# Patient Record
Sex: MALE | Race: WHITE | Employment: FULL TIME | URBAN - METROPOLITAN AREA
[De-identification: names, ages, dates, MRNs, and addresses within clinical notes are randomized per-mention and may not be internally consistent; named-entity substitution may affect disease eponyms.]

---

## 2017-01-02 ENCOUNTER — TELEPHONE (OUTPATIENT)
Dept: FAMILY MEDICINE CLINIC | Facility: CLINIC | Age: 52
End: 2017-01-02

## 2017-01-02 ENCOUNTER — OFFICE VISIT (OUTPATIENT)
Dept: FAMILY MEDICINE CLINIC | Facility: CLINIC | Age: 52
End: 2017-01-02

## 2017-01-02 VITALS
HEART RATE: 82 BPM | WEIGHT: 185 LBS | RESPIRATION RATE: 20 BRPM | TEMPERATURE: 99 F | DIASTOLIC BLOOD PRESSURE: 78 MMHG | SYSTOLIC BLOOD PRESSURE: 120 MMHG | BODY MASS INDEX: 27 KG/M2

## 2017-01-02 DIAGNOSIS — J01.00 ACUTE NON-RECURRENT MAXILLARY SINUSITIS: Primary | ICD-10-CM

## 2017-01-02 PROCEDURE — 99213 OFFICE O/P EST LOW 20 MIN: CPT | Performed by: FAMILY MEDICINE

## 2017-01-02 RX ORDER — FLUTICASONE PROPIONATE 50 MCG
1 SPRAY, SUSPENSION (ML) NASAL 2 TIMES DAILY
Qty: 1 BOTTLE | Refills: 0 | Status: SHIPPED | OUTPATIENT
Start: 2017-01-02 | End: 2017-04-20 | Stop reason: ALTCHOICE

## 2017-01-02 RX ORDER — AZITHROMYCIN 250 MG/1
TABLET, FILM COATED ORAL
Qty: 6 TABLET | Refills: 0 | Status: SHIPPED | OUTPATIENT
Start: 2017-01-02 | End: 2017-04-20 | Stop reason: ALTCHOICE

## 2017-01-02 RX ORDER — MOMETASONE 50 UG/1
1 SPRAY, METERED NASAL 2 TIMES DAILY
Qty: 1 BOTTLE | Refills: 1 | Status: SHIPPED | OUTPATIENT
Start: 2017-01-02 | End: 2017-01-02 | Stop reason: ALTCHOICE

## 2017-01-02 NOTE — PROGRESS NOTES
Rock Fox is a 46year old male. Patient presents with:  Sinus Problem: coughing    HPI:    Symptoms started  7  days ago with purulent nasal discharge, maxillary sinus pressure, and headache, a lot of  post-nasal drip. Worsening.     Allergies:  No rhonchi, or rales. Heart: S1/S2 regular no murmurs.             ASSESSMENT/ PLAN:       (J01.00) Acute non-recurrent maxillary sinusitis  (primary encounter diagnosis)  Plan:     Signed Prescriptions Disp Refills    azithromycin (ZITHROMAX Z-LEORA) 250 MG

## 2017-01-02 NOTE — PATIENT INSTRUCTIONS
Acute Sinusitis    Acute sinusitis is inflammation (irritation and swelling) of the sinuses. It is usually from a bacterial infection that follows an upper respiratory viral infection. Your doctor can help you find relief. Read on to learn more.   What is Treatment is designed to unblock the sinus opening and help the cilia work again. Antihistamine and decongestant medications may be prescribed. These can reduce inflammation and decrease fluid production.  If a bacterial infection is present, it is treated

## 2017-01-02 NOTE — TELEPHONE ENCOUNTER
Thanh and spoke with Giovani Caputo. He advised that Nasonex is not covered for patient. Please advise on alternative nasal spray for patient. Thank you!

## 2017-04-20 PROBLEM — I25.3 ATRIAL SEPTAL ANEURYSM: Status: ACTIVE | Noted: 2017-04-20

## 2017-04-20 PROBLEM — I10 ESSENTIAL HYPERTENSION: Status: ACTIVE | Noted: 2017-04-20

## 2017-04-20 PROBLEM — E66.09 NON MORBID OBESITY DUE TO EXCESS CALORIES: Status: ACTIVE | Noted: 2017-04-20

## 2017-04-20 PROBLEM — R01.1 MURMUR: Status: ACTIVE | Noted: 2017-04-20

## 2017-04-20 PROBLEM — E78.2 MIXED HYPERLIPIDEMIA: Status: ACTIVE | Noted: 2017-04-20

## 2018-03-28 ENCOUNTER — OFFICE VISIT (OUTPATIENT)
Dept: FAMILY MEDICINE CLINIC | Facility: CLINIC | Age: 53
End: 2018-03-28

## 2018-03-28 ENCOUNTER — LAB ENCOUNTER (OUTPATIENT)
Dept: LAB | Age: 53
End: 2018-03-28
Attending: FAMILY MEDICINE
Payer: COMMERCIAL

## 2018-03-28 VITALS
TEMPERATURE: 99 F | DIASTOLIC BLOOD PRESSURE: 80 MMHG | HEIGHT: 68 IN | BODY MASS INDEX: 28.19 KG/M2 | WEIGHT: 186 LBS | RESPIRATION RATE: 16 BRPM | HEART RATE: 114 BPM | SYSTOLIC BLOOD PRESSURE: 138 MMHG

## 2018-03-28 DIAGNOSIS — E78.2 MIXED HYPERLIPIDEMIA: ICD-10-CM

## 2018-03-28 DIAGNOSIS — Z13.228 SCREENING FOR ENDOCRINE, NUTRITIONAL, METABOLIC AND IMMUNITY DISORDER: ICD-10-CM

## 2018-03-28 DIAGNOSIS — Z13.21 SCREENING FOR ENDOCRINE, NUTRITIONAL, METABOLIC AND IMMUNITY DISORDER: ICD-10-CM

## 2018-03-28 DIAGNOSIS — J01.01 ACUTE RECURRENT MAXILLARY SINUSITIS: ICD-10-CM

## 2018-03-28 DIAGNOSIS — I10 ESSENTIAL HYPERTENSION: Primary | ICD-10-CM

## 2018-03-28 DIAGNOSIS — Z13.29 SCREENING FOR ENDOCRINE, NUTRITIONAL, METABOLIC AND IMMUNITY DISORDER: ICD-10-CM

## 2018-03-28 DIAGNOSIS — Z13.0 SCREENING FOR ENDOCRINE, NUTRITIONAL, METABOLIC AND IMMUNITY DISORDER: ICD-10-CM

## 2018-03-28 DIAGNOSIS — R35.0 URINE FREQUENCY: ICD-10-CM

## 2018-03-28 LAB
ALBUMIN SERPL-MCNC: 4.2 G/DL (ref 3.5–4.8)
ALP LIVER SERPL-CCNC: 62 U/L (ref 45–117)
ALT SERPL-CCNC: 39 U/L (ref 17–63)
APPEARANCE: CLEAR
AST SERPL-CCNC: 19 U/L (ref 15–41)
BASOPHILS # BLD AUTO: 0.03 X10(3) UL (ref 0–0.1)
BASOPHILS NFR BLD AUTO: 0.3 %
BILIRUB SERPL-MCNC: 0.9 MG/DL (ref 0.1–2)
BILIRUBIN: NEGATIVE
BUN BLD-MCNC: 12 MG/DL (ref 8–20)
CALCIUM BLD-MCNC: 9.6 MG/DL (ref 8.3–10.3)
CHLORIDE: 99 MMOL/L (ref 101–111)
CHOLEST SMN-MCNC: 185 MG/DL (ref ?–200)
CO2: 30 MMOL/L (ref 22–32)
COMPLEXED PSA SERPL-MCNC: 0.65 NG/ML (ref 0.01–4)
CREAT BLD-MCNC: 0.97 MG/DL (ref 0.7–1.3)
EOSINOPHIL # BLD AUTO: 0.21 X10(3) UL (ref 0–0.3)
EOSINOPHIL NFR BLD AUTO: 2.2 %
ERYTHROCYTE [DISTWIDTH] IN BLOOD BY AUTOMATED COUNT: 11.7 % (ref 11.5–16)
GLUCOSE (URINE DIPSTICK): NEGATIVE MG/DL
GLUCOSE BLD-MCNC: 168 MG/DL (ref 70–99)
HCT VFR BLD AUTO: 48.6 % (ref 37–53)
HDLC SERPL-MCNC: 51 MG/DL (ref 45–?)
HDLC SERPL: 3.63 {RATIO} (ref ?–4.97)
HGB BLD-MCNC: 16 G/DL (ref 13–17)
IMMATURE GRANULOCYTE COUNT: 0.04 X10(3) UL (ref 0–1)
IMMATURE GRANULOCYTE RATIO %: 0.4 %
KETONES (URINE DIPSTICK): NEGATIVE MG/DL
LDLC SERPL CALC-MCNC: 120 MG/DL (ref ?–130)
LEUKOCYTES: NEGATIVE
LYMPHOCYTES # BLD AUTO: 1.16 X10(3) UL (ref 0.9–4)
LYMPHOCYTES NFR BLD AUTO: 12.4 %
M PROTEIN MFR SERPL ELPH: 7.8 G/DL (ref 6.1–8.3)
MCH RBC QN AUTO: 30.8 PG (ref 27–33.2)
MCHC RBC AUTO-ENTMCNC: 32.9 G/DL (ref 31–37)
MCV RBC AUTO: 93.5 FL (ref 80–99)
MONOCYTES # BLD AUTO: 0.7 X10(3) UL (ref 0.1–1)
MONOCYTES NFR BLD AUTO: 7.5 %
MULTISTIX LOT#: NORMAL NUMERIC
NEUTROPHIL ABS PRELIM: 7.24 X10 (3) UL (ref 1.3–6.7)
NEUTROPHILS # BLD AUTO: 7.24 X10(3) UL (ref 1.3–6.7)
NEUTROPHILS NFR BLD AUTO: 77.2 %
NITRITE, URINE: NEGATIVE
NONHDLC SERPL-MCNC: 134 MG/DL (ref ?–130)
OCCULT BLOOD: NEGATIVE
PH, URINE: 6 (ref 4.5–8)
PLATELET # BLD AUTO: 243 10(3)UL (ref 150–450)
POTASSIUM SERPL-SCNC: 4.3 MMOL/L (ref 3.6–5.1)
PROTEIN (URINE DIPSTICK): NEGATIVE MG/DL
RBC # BLD AUTO: 5.2 X10(6)UL (ref 4.3–5.7)
RED CELL DISTRIBUTION WIDTH-SD: 40.1 FL (ref 35.1–46.3)
SODIUM SERPL-SCNC: 136 MMOL/L (ref 136–144)
SPECIFIC GRAVITY: 1.02 (ref 1–1.03)
TRIGL SERPL-MCNC: 70 MG/DL (ref ?–150)
TSI SER-ACNC: 0.51 MIU/ML (ref 0.35–5.5)
URINE-COLOR: YELLOW
UROBILINOGEN,SEMI-QN: 0.2 MG/DL (ref 0–1.9)
VLDLC SERPL CALC-MCNC: 14 MG/DL (ref 5–40)
WBC # BLD AUTO: 9.4 X10(3) UL (ref 4–13)

## 2018-03-28 PROCEDURE — 80050 GENERAL HEALTH PANEL: CPT | Performed by: FAMILY MEDICINE

## 2018-03-28 PROCEDURE — 87086 URINE CULTURE/COLONY COUNT: CPT | Performed by: FAMILY MEDICINE

## 2018-03-28 PROCEDURE — 36415 COLL VENOUS BLD VENIPUNCTURE: CPT | Performed by: FAMILY MEDICINE

## 2018-03-28 PROCEDURE — 84153 ASSAY OF PSA TOTAL: CPT | Performed by: FAMILY MEDICINE

## 2018-03-28 PROCEDURE — 81003 URINALYSIS AUTO W/O SCOPE: CPT | Performed by: FAMILY MEDICINE

## 2018-03-28 PROCEDURE — 80061 LIPID PANEL: CPT | Performed by: FAMILY MEDICINE

## 2018-03-28 PROCEDURE — 99214 OFFICE O/P EST MOD 30 MIN: CPT | Performed by: FAMILY MEDICINE

## 2018-03-28 RX ORDER — SULFAMETHOXAZOLE AND TRIMETHOPRIM 800; 160 MG/1; MG/1
1 TABLET ORAL 2 TIMES DAILY
Qty: 20 TABLET | Refills: 0 | Status: SHIPPED | OUTPATIENT
Start: 2018-03-28 | End: 2018-04-07

## 2018-03-29 NOTE — PROGRESS NOTES
Chief Complaint:   Patient presents with:  Urinary Symptoms (urologic): noticed about one week ago, urine frequency  URI: Symptoms started about 2-3 ago     HPI:   This is a 46year old male presenting for follow up.    Patient has been having urinary compl Prescriptions:  Sulfamethoxazole-TMP DS (BACTRIM DS) 800-160 MG Oral Tab per tablet Take 1 tablet by mouth 2 (two) times daily.  Disp: 20 tablet Rfl: 0   Pravastatin Sodium 80 MG Oral Tab TAKE 1 TABLET EVERY EVENING Disp: 90 tablet Rfl: 1   Doxycycline Hycl from the following:    Height as of this encounter: 68\". Weight as of this encounter: 186 lb. Vital signs reviewed. Appears stated age, well groomed. Physical Exam   Nursing note and vitals reviewed.    Constitutional: He is oriented to person, place, Future  - PSA SCREEN; Future    2. Urine frequency  -will check urine culture.  Empiric treatment with oral abx.   - URINALYSIS, AUTO, W/O SCOPE  - URINE CULTURE, ROUTINE; Future  - URINE CULTURE, ROUTINE  - Sulfamethoxazole-TMP DS (BACTRIM DS) 800-160 MG O

## 2018-03-30 ENCOUNTER — TELEPHONE (OUTPATIENT)
Dept: FAMILY MEDICINE CLINIC | Facility: CLINIC | Age: 53
End: 2018-03-30

## 2018-03-30 DIAGNOSIS — R73.09 ELEVATED GLUCOSE LEVEL: ICD-10-CM

## 2018-03-30 DIAGNOSIS — E78.2 MIXED HYPERLIPIDEMIA: Primary | ICD-10-CM

## 2018-03-30 NOTE — TELEPHONE ENCOUNTER
Called patient and spoke with him. Went over results and POC below. Patient states understanding. Future lab orders placed in Epic. Patient is requesting an order for a dietician. Okay for order? Please advise. Thank you!

## 2018-03-30 NOTE — TELEPHONE ENCOUNTER
----- Message from Dyllan Earl MD sent at 3/30/2018 12:30 PM CDT -----  Recheck CMP and lipid continue with low-fat low-cholesterol needs A1c on recheck labs in 3 months.   Advise based on CMP he may have early signs of diabetes advised on low carb diet es

## 2018-04-02 NOTE — TELEPHONE ENCOUNTER
Order placed in [facility] [address]. Called patient and left message for patient. Advised patient of this information. Patient provided Central Scheduling's information. Advised patient to contact the office with any questions.

## 2018-12-26 ENCOUNTER — OFFICE VISIT (OUTPATIENT)
Dept: FAMILY MEDICINE CLINIC | Facility: CLINIC | Age: 53
End: 2018-12-26
Payer: COMMERCIAL

## 2018-12-26 VITALS
WEIGHT: 189 LBS | SYSTOLIC BLOOD PRESSURE: 132 MMHG | RESPIRATION RATE: 14 BRPM | HEART RATE: 76 BPM | OXYGEN SATURATION: 98 % | TEMPERATURE: 98 F | DIASTOLIC BLOOD PRESSURE: 86 MMHG | BODY MASS INDEX: 28.64 KG/M2 | HEIGHT: 68 IN

## 2018-12-26 DIAGNOSIS — R39.9 UTI SYMPTOMS: Primary | ICD-10-CM

## 2018-12-26 PROCEDURE — 81003 URINALYSIS AUTO W/O SCOPE: CPT | Performed by: NURSE PRACTITIONER

## 2018-12-26 PROCEDURE — 99213 OFFICE O/P EST LOW 20 MIN: CPT | Performed by: NURSE PRACTITIONER

## 2018-12-26 PROCEDURE — 87086 URINE CULTURE/COLONY COUNT: CPT | Performed by: NURSE PRACTITIONER

## 2018-12-26 RX ORDER — SULFAMETHOXAZOLE AND TRIMETHOPRIM 800; 160 MG/1; MG/1
1 TABLET ORAL 2 TIMES DAILY
Qty: 20 TABLET | Refills: 0 | Status: SHIPPED | OUTPATIENT
Start: 2018-12-26 | End: 2019-01-05

## 2018-12-26 NOTE — PROGRESS NOTES
Chief Complaint:   Patient presents with:  UTI: urge to urinate, burning, uncomfortable, x5days     HPI:   This is a 48year old male presenting with UTI symptoms x 5 days.  He reports increased urgency and frequency of urination, as well as intermittent dy palpitations. Gastrointestinal: Negative for nausea, vomiting, abdominal pain, diarrhea, blood in stool, anal bleeding and rectal pain. Genitourinary: Positive for dysuria, urgency and frequency.  Negative for bladder incontinence, hematuria, flank pain Will return to repeat labs. -Low carb diet. -Push fluids.

## 2018-12-26 NOTE — PATIENT INSTRUCTIONS
Urinary Tract Infections in Men    Urinary tract infections (UTIs) are most often caused by bacteria that invade the urinary tract. The bacteria may come from outside the body. Or they may travel from the skin outside of the rectum into the urethra.  Pain The lifestyle changes below will help get rid of your current infection. They may also help prevent future UTIs. · Drink plenty of fluids such as water, juice, or other caffeine-free drinks. This helps flush bacteria out of your system.   · Empty your blad

## 2019-01-02 ENCOUNTER — TELEPHONE (OUTPATIENT)
Dept: FAMILY MEDICINE CLINIC | Facility: CLINIC | Age: 54
End: 2019-01-02

## 2019-01-02 DIAGNOSIS — R30.9 PAIN WITH URINATION: Primary | ICD-10-CM

## 2019-01-02 NOTE — TELEPHONE ENCOUNTER
Pt states he was advised to dc antibiotic, but decided to complete it. Pt is still having discomfort/burning w/ urination. Pt requested referral for urology (as recommended by NP via [employer] msg). Pt given referral and information for urology.  [facility]

## 2019-01-02 NOTE — TELEPHONE ENCOUNTER
Pt called and would like a referral for a urologist and also would like someone to call him back to answer a few questions

## 2019-01-04 ENCOUNTER — OFFICE VISIT (OUTPATIENT)
Dept: FAMILY MEDICINE CLINIC | Facility: CLINIC | Age: 54
End: 2019-01-04
Payer: COMMERCIAL

## 2019-01-04 VITALS
DIASTOLIC BLOOD PRESSURE: 80 MMHG | RESPIRATION RATE: 16 BRPM | HEART RATE: 78 BPM | HEIGHT: 68 IN | SYSTOLIC BLOOD PRESSURE: 122 MMHG | TEMPERATURE: 98 F | BODY MASS INDEX: 28.49 KG/M2 | WEIGHT: 188 LBS

## 2019-01-04 DIAGNOSIS — N28.1 CYST OF LEFT KIDNEY: ICD-10-CM

## 2019-01-04 DIAGNOSIS — R35.89 POLYURIA: Primary | ICD-10-CM

## 2019-01-04 DIAGNOSIS — R33.9 INCOMPLETE EMPTYING OF BLADDER: ICD-10-CM

## 2019-01-04 PROCEDURE — 99214 OFFICE O/P EST MOD 30 MIN: CPT | Performed by: FAMILY MEDICINE

## 2019-01-04 RX ORDER — TAMSULOSIN HYDROCHLORIDE 0.4 MG/1
0.4 CAPSULE ORAL DAILY
Qty: 30 CAPSULE | Refills: 3 | Status: SHIPPED | OUTPATIENT
Start: 2019-01-04 | End: 2019-02-03

## 2019-01-04 NOTE — PROGRESS NOTES
Chief Complaint:   Patient presents with: Follow - Up    HPI:   This is a 48year old male presenting for for follow up after seen in [facility] for possible cystitis. Completed course of oral abx, urine culture's negative.    Patient reports mild urinary hesitan Medications:  Sulfamethoxazole-TMP DS (BACTRIM DS) 800-160 MG Oral Tab per tablet Take 1 tablet by mouth 2 (two) times daily for 10 days.  Disp: 20 tablet Rfl: 0   Pravastatin Sodium 80 MG Oral Tab TAKE 1 TABLET EVERY EVENING Disp: 90 tablet Rfl: 0   Doxycy encounter: 68\". Weight as of this encounter: 188 lb. Vital signs reviewed. Appears stated age, well groomed. Physical Exam   Nursing note and vitals reviewed. Constitutional: He is oriented to person, place, and time.  He appears well-developed and tamsulosin HCl (FLOMAX) 0.4 MG Oral Cap; Take 1 capsule (0.4 mg total) by mouth daily. Dispense: 30 capsule; Refill: 3  - US KIDNEY/BLADDER (CPT=76770); Future    3.  Cyst of left kidney  -will refer to dr. Bianka Zambrano for possible cysto  - US KIDNEY/BLADDER (CPT

## 2019-01-05 ENCOUNTER — HOSPITAL ENCOUNTER (OUTPATIENT)
Dept: ULTRASOUND IMAGING | Age: 54
Discharge: HOME | End: 2019-01-05
Attending: FAMILY MEDICINE
Payer: COMMERCIAL

## 2019-01-05 DIAGNOSIS — N28.1 CYST OF LEFT KIDNEY: ICD-10-CM

## 2019-01-05 DIAGNOSIS — R35.89 POLYURIA: ICD-10-CM

## 2019-01-05 DIAGNOSIS — R33.9 INCOMPLETE EMPTYING OF BLADDER: ICD-10-CM

## 2019-01-05 PROCEDURE — 76770 US EXAM ABDO BACK WALL COMP: CPT | Performed by: FAMILY MEDICINE

## 2019-02-14 PROCEDURE — 87086 URINE CULTURE/COLONY COUNT: CPT | Performed by: UROLOGY

## 2019-02-20 ENCOUNTER — TELEPHONE (OUTPATIENT)
Dept: FAMILY MEDICINE CLINIC | Facility: CLINIC | Age: 54
End: 2019-02-20

## 2019-02-20 DIAGNOSIS — Z12.5 SCREENING FOR PROSTATE CANCER: ICD-10-CM

## 2019-02-20 DIAGNOSIS — Z00.00 ROUTINE GENERAL MEDICAL EXAMINATION AT A HEALTH CARE FACILITY: ICD-10-CM

## 2019-02-20 DIAGNOSIS — Z13.0 SCREENING FOR IRON DEFICIENCY ANEMIA: ICD-10-CM

## 2019-02-20 DIAGNOSIS — E78.2 MIXED HYPERLIPIDEMIA: Primary | ICD-10-CM

## 2019-02-20 DIAGNOSIS — Z13.29 SCREENING FOR THYROID DISORDER: ICD-10-CM

## 2019-02-20 DIAGNOSIS — R73.01 ELEVATED FASTING GLUCOSE: ICD-10-CM

## 2019-02-20 NOTE — TELEPHONE ENCOUNTER
Pt would like us to put in orders for blood work. Pt has physical appt on 3/19 and wants to get that done before his appt. Aware that blood work orders will be in the system after 72 hrs.

## 2019-03-19 ENCOUNTER — OFFICE VISIT (OUTPATIENT)
Dept: FAMILY MEDICINE CLINIC | Facility: CLINIC | Age: 54
End: 2019-03-19
Payer: COMMERCIAL

## 2019-03-19 VITALS
BODY MASS INDEX: 29.25 KG/M2 | RESPIRATION RATE: 16 BRPM | SYSTOLIC BLOOD PRESSURE: 122 MMHG | HEART RATE: 72 BPM | TEMPERATURE: 98 F | DIASTOLIC BLOOD PRESSURE: 80 MMHG | HEIGHT: 68 IN | WEIGHT: 193 LBS

## 2019-03-19 DIAGNOSIS — I10 ESSENTIAL HYPERTENSION: ICD-10-CM

## 2019-03-19 DIAGNOSIS — Z00.00 ANNUAL PHYSICAL EXAM: Primary | ICD-10-CM

## 2019-03-19 DIAGNOSIS — E78.2 MIXED HYPERLIPIDEMIA: ICD-10-CM

## 2019-03-19 DIAGNOSIS — N40.0 BENIGN PROSTATIC HYPERPLASIA WITHOUT LOWER URINARY TRACT SYMPTOMS: ICD-10-CM

## 2019-03-19 PROCEDURE — 99396 PREV VISIT EST AGE 40-64: CPT | Performed by: FAMILY MEDICINE

## 2019-03-19 RX ORDER — TAMSULOSIN HYDROCHLORIDE 0.4 MG/1
0.4 CAPSULE ORAL DAILY
Qty: 90 CAPSULE | Refills: 1 | Status: SHIPPED | OUTPATIENT
Start: 2019-03-19 | End: 2019-09-11

## 2019-03-19 RX ORDER — TAMSULOSIN HYDROCHLORIDE 0.4 MG/1
CAPSULE ORAL
COMMUNITY
Start: 2019-03-02 | End: 2019-03-19

## 2019-03-20 NOTE — PROGRESS NOTES
Chief Complaint:   Patient presents with:  Physical    HPI:   This is a 48year old male presenting for annual physical.     Patient has history of HL-stable with statin, no myalgia, normal LFTs. Patient presents for recheck of his hypertension.  Pt has b Negative for photophobia, pain, discharge, redness, itching and visual disturbance. Respiratory: Negative for cough, chest tightness and shortness of breath. Cardiovascular: Negative for chest pain, palpitations and leg swelling.    Gastrointestinal: N JVD present. No tracheal deviation present. No thyromegaly present. Cardiovascular: Normal rate, regular rhythm, normal heart sounds and intact distal pulses. No murmur heard. Edema not present.   Pulmonary/Chest: Effort normal and breath sounds norm

## 2019-09-11 DIAGNOSIS — N40.0 BENIGN PROSTATIC HYPERPLASIA WITHOUT LOWER URINARY TRACT SYMPTOMS: ICD-10-CM

## 2019-09-11 RX ORDER — TAMSULOSIN HYDROCHLORIDE 0.4 MG/1
0.4 CAPSULE ORAL DAILY
Qty: 90 CAPSULE | Refills: 0 | Status: SHIPPED | OUTPATIENT
Start: 2019-09-11 | End: 2019-09-16

## 2019-09-16 DIAGNOSIS — N40.0 BENIGN PROSTATIC HYPERPLASIA WITHOUT LOWER URINARY TRACT SYMPTOMS: ICD-10-CM

## 2019-09-16 RX ORDER — TAMSULOSIN HYDROCHLORIDE 0.4 MG/1
0.4 CAPSULE ORAL DAILY
Qty: 90 CAPSULE | Refills: 0 | Status: SHIPPED | OUTPATIENT
Start: 2019-09-16 | End: 2020-02-26

## 2019-09-16 NOTE — TELEPHONE ENCOUNTER
Medication(s) to Refill:   Requested Prescriptions     Pending Prescriptions Disp Refills   • tamsulosin HCl 0.4 MG Oral Cap 90 capsule 0     Sig: Take 1 capsule (0.4 mg total) by mouth daily.          Reason for Medication Refill being sent to Provider / Ilia Pedro

## 2019-11-11 ENCOUNTER — LAB ENCOUNTER (OUTPATIENT)
Dept: LAB | Age: 54
End: 2019-11-11
Attending: FAMILY MEDICINE
Payer: COMMERCIAL

## 2019-11-11 ENCOUNTER — OFFICE VISIT (OUTPATIENT)
Dept: FAMILY MEDICINE CLINIC | Facility: CLINIC | Age: 54
End: 2019-11-11
Payer: COMMERCIAL

## 2019-11-11 VITALS
DIASTOLIC BLOOD PRESSURE: 76 MMHG | BODY MASS INDEX: 29.4 KG/M2 | SYSTOLIC BLOOD PRESSURE: 128 MMHG | HEIGHT: 68 IN | HEART RATE: 88 BPM | WEIGHT: 194 LBS | RESPIRATION RATE: 16 BRPM | TEMPERATURE: 98 F

## 2019-11-11 DIAGNOSIS — Z13.29 SCREENING FOR THYROID DISORDER: ICD-10-CM

## 2019-11-11 DIAGNOSIS — I10 ESSENTIAL HYPERTENSION: Primary | ICD-10-CM

## 2019-11-11 DIAGNOSIS — R73.01 ELEVATED FASTING GLUCOSE: ICD-10-CM

## 2019-11-11 DIAGNOSIS — Z13.0 SCREENING FOR IRON DEFICIENCY ANEMIA: ICD-10-CM

## 2019-11-11 DIAGNOSIS — J02.9 ACUTE PHARYNGITIS, UNSPECIFIED ETIOLOGY: ICD-10-CM

## 2019-11-11 DIAGNOSIS — E78.2 MIXED HYPERLIPIDEMIA: ICD-10-CM

## 2019-11-11 DIAGNOSIS — Z00.00 ROUTINE GENERAL MEDICAL EXAMINATION AT A HEALTH CARE FACILITY: ICD-10-CM

## 2019-11-11 DIAGNOSIS — E78.5 DYSLIPIDEMIA: ICD-10-CM

## 2019-11-11 DIAGNOSIS — Z12.5 SCREENING FOR PROSTATE CANCER: ICD-10-CM

## 2019-11-11 PROCEDURE — 83036 HEMOGLOBIN GLYCOSYLATED A1C: CPT | Performed by: FAMILY MEDICINE

## 2019-11-11 PROCEDURE — 84153 ASSAY OF PSA TOTAL: CPT | Performed by: FAMILY MEDICINE

## 2019-11-11 PROCEDURE — 80061 LIPID PANEL: CPT | Performed by: FAMILY MEDICINE

## 2019-11-11 PROCEDURE — 80050 GENERAL HEALTH PANEL: CPT | Performed by: FAMILY MEDICINE

## 2019-11-11 PROCEDURE — 36415 COLL VENOUS BLD VENIPUNCTURE: CPT | Performed by: FAMILY MEDICINE

## 2019-11-11 PROCEDURE — 99214 OFFICE O/P EST MOD 30 MIN: CPT | Performed by: FAMILY MEDICINE

## 2019-11-11 RX ORDER — AZITHROMYCIN 250 MG/1
TABLET, FILM COATED ORAL
Qty: 6 TABLET | Refills: 0 | Status: SHIPPED | OUTPATIENT
Start: 2019-11-11 | End: 2019-12-17

## 2019-11-11 RX ORDER — AZELASTINE 1 MG/ML
2 SPRAY, METERED NASAL 2 TIMES DAILY
Qty: 2 BOTTLE | Refills: 3 | Status: SHIPPED | OUTPATIENT
Start: 2019-11-11 | End: 2021-02-27 | Stop reason: ALTCHOICE

## 2019-11-12 NOTE — PROGRESS NOTES
Chief Complaint:   Patient presents with: Follow - Up    HPI:   This is a 47year old male presenting for follow up:    Patient has history of HL-stable with statin, no myalgia, normal LFTs. Patient presents for recheck of his hypertension.  Pt has been Meds:  Current Outpatient Medications   Medication Sig Dispense Refill   • azithromycin (ZITHROMAX Z-LEORA) 250 MG Oral Tab Take two tablets by mouth today, then one tablet daily.  6 tablet 0   • Azelastine HCl 0.1 % Nasal Solution 2 sprays by Nasal route 2 ( Temp 98.2 °F (36.8 °C) (Oral)   Resp 16   Ht 68\"   Wt 194 lb (88 kg)   BMI 29.50 kg/m²  Estimated body mass index is 29.5 kg/m² as calculated from the following:    Height as of this encounter: 68\". Weight as of this encounter: 194 lb (88 kg).    Vital Dyslipidemia  -stable, CPM    3. Mixed hyperlipidemia  -stable, CPM    4. Acute pharyngitis, unspecified etiology  -oral abx and nasal spray. - azithromycin (ZITHROMAX Z-LEORA) 250 MG Oral Tab; Take two tablets by mouth today, then one tablet daily.   Dispe

## 2019-11-13 ENCOUNTER — TELEPHONE (OUTPATIENT)
Dept: FAMILY MEDICINE CLINIC | Facility: CLINIC | Age: 54
End: 2019-11-13

## 2019-11-13 DIAGNOSIS — E78.2 MIXED HYPERLIPIDEMIA: Primary | ICD-10-CM

## 2019-11-13 DIAGNOSIS — R73.03 PREDIABETES: ICD-10-CM

## 2019-11-13 NOTE — TELEPHONE ENCOUNTER
----- Message from Elizabeth Barahona MD sent at 11/13/2019 10:11 AM CST -----  Stable prediabetic labs recheck in 6 months.

## 2019-11-13 NOTE — TELEPHONE ENCOUNTER
Left detailed VM for pt regarding results and instructions listed below. Advised pt on dietary modification. Pt instructed to call back with any further questions/concerns.

## 2019-12-17 ENCOUNTER — OFFICE VISIT (OUTPATIENT)
Dept: FAMILY MEDICINE CLINIC | Facility: CLINIC | Age: 54
End: 2019-12-17
Payer: COMMERCIAL

## 2019-12-17 VITALS
HEART RATE: 94 BPM | RESPIRATION RATE: 16 BRPM | BODY MASS INDEX: 29.55 KG/M2 | HEIGHT: 68 IN | WEIGHT: 195 LBS | TEMPERATURE: 98 F | DIASTOLIC BLOOD PRESSURE: 80 MMHG | SYSTOLIC BLOOD PRESSURE: 132 MMHG

## 2019-12-17 DIAGNOSIS — E78.2 MIXED HYPERLIPIDEMIA: ICD-10-CM

## 2019-12-17 DIAGNOSIS — R73.03 PRE-DIABETES: Primary | ICD-10-CM

## 2019-12-17 DIAGNOSIS — B37.9 YEAST INFECTION: ICD-10-CM

## 2019-12-17 DIAGNOSIS — I10 ESSENTIAL HYPERTENSION: ICD-10-CM

## 2019-12-17 PROCEDURE — 99213 OFFICE O/P EST LOW 20 MIN: CPT | Performed by: FAMILY MEDICINE

## 2019-12-17 RX ORDER — FLUCONAZOLE 150 MG/1
150 TABLET ORAL DAILY
Qty: 3 TABLET | Refills: 0 | Status: SHIPPED | OUTPATIENT
Start: 2019-12-17 | End: 2019-12-20

## 2019-12-18 NOTE — PROGRESS NOTES
Chief Complaint:   Patient presents with:  Derm Problem: rash on gential area noticed about 1-2 weeks ago     HPI:   This is a 47year old male presenting with rash to genital area, was exposed to yeast per his wife. Patient reports no urinary hesitancy. use: No    Family History:  Family History   Problem Relation Age of Onset   • Cancer Mother    • Cancer Maternal Grandmother    • Heart Disease Neg    • Stroke Neg      Allergies:  No Known Allergies  Current Meds:  Current Outpatient Medications   Medica adenopathy. Does not bruise/bleed easily. Psychiatric/Behavioral: Negative for suicidal ideas, behavioral problems, sleep disturbance and agitation. The patient is not nervous/anxious.         EXAM:   /80   Pulse 94   Temp 98.3 °F (36.8 °C) (Oral) mood and affect. His behavior is normal. Judgment and thought content normal.        ASSESSMENT AND PLAN:   Diagnoses and all orders for this visit:  1. Pre-diabetes  -continue with diet control, will check in 6 months.      2. Essential hypertension  -stab

## 2020-02-26 DIAGNOSIS — N40.0 BENIGN PROSTATIC HYPERPLASIA WITHOUT LOWER URINARY TRACT SYMPTOMS: ICD-10-CM

## 2020-02-26 RX ORDER — TAMSULOSIN HYDROCHLORIDE 0.4 MG/1
CAPSULE ORAL
Qty: 90 CAPSULE | Refills: 0 | Status: SHIPPED | OUTPATIENT
Start: 2020-02-26 | End: 2020-05-15

## 2020-02-26 NOTE — TELEPHONE ENCOUNTER
Medication(s) to Refill:   Requested Prescriptions     Pending Prescriptions Disp Refills   • tamsulosin (FLOMAX) cap [Pharmacy Med Name: TAMSULOSIN CAP 0.4MG] 90 capsule 0     Sig: TAKE 1 CAPSULE DAILY         Reason for Medication Refill being sent to Pr

## 2020-03-03 PROBLEM — M23.91 INTERNAL DERANGEMENT OF KNEE, RIGHT: Status: ACTIVE | Noted: 2020-03-03

## 2020-05-15 DIAGNOSIS — N40.0 BENIGN PROSTATIC HYPERPLASIA WITHOUT LOWER URINARY TRACT SYMPTOMS: ICD-10-CM

## 2020-05-15 RX ORDER — TAMSULOSIN HYDROCHLORIDE 0.4 MG/1
CAPSULE ORAL
Qty: 90 CAPSULE | Refills: 0 | Status: SHIPPED | OUTPATIENT
Start: 2020-05-15 | End: 2020-09-01

## 2020-09-01 DIAGNOSIS — N40.0 BENIGN PROSTATIC HYPERPLASIA WITHOUT LOWER URINARY TRACT SYMPTOMS: ICD-10-CM

## 2020-09-01 RX ORDER — TAMSULOSIN HYDROCHLORIDE 0.4 MG/1
CAPSULE ORAL
Qty: 90 CAPSULE | Refills: 0 | Status: SHIPPED | OUTPATIENT
Start: 2020-09-01 | End: 2020-11-30

## 2020-11-28 DIAGNOSIS — N40.0 BENIGN PROSTATIC HYPERPLASIA WITHOUT LOWER URINARY TRACT SYMPTOMS: ICD-10-CM

## 2020-11-30 RX ORDER — TAMSULOSIN HYDROCHLORIDE 0.4 MG/1
CAPSULE ORAL
Qty: 90 CAPSULE | Refills: 0 | Status: SHIPPED | OUTPATIENT
Start: 2020-11-30 | End: 2021-02-27

## 2021-02-26 DIAGNOSIS — N40.0 BENIGN PROSTATIC HYPERPLASIA WITHOUT LOWER URINARY TRACT SYMPTOMS: ICD-10-CM

## 2021-02-27 ENCOUNTER — OFFICE VISIT (OUTPATIENT)
Dept: FAMILY MEDICINE CLINIC | Facility: CLINIC | Age: 56
End: 2021-02-27
Payer: COMMERCIAL

## 2021-02-27 VITALS
DIASTOLIC BLOOD PRESSURE: 84 MMHG | OXYGEN SATURATION: 97 % | BODY MASS INDEX: 30.01 KG/M2 | HEIGHT: 68 IN | SYSTOLIC BLOOD PRESSURE: 130 MMHG | WEIGHT: 198 LBS | HEART RATE: 101 BPM | TEMPERATURE: 98 F | RESPIRATION RATE: 18 BRPM

## 2021-02-27 DIAGNOSIS — R73.03 PRE-DIABETES: ICD-10-CM

## 2021-02-27 DIAGNOSIS — E78.2 MIXED HYPERLIPIDEMIA: ICD-10-CM

## 2021-02-27 DIAGNOSIS — Z98.890 S/P ARTHROSCOPIC SURGERY OF LEFT KNEE: ICD-10-CM

## 2021-02-27 DIAGNOSIS — I10 ESSENTIAL HYPERTENSION: ICD-10-CM

## 2021-02-27 DIAGNOSIS — G44.209 TENSION HEADACHE: Primary | ICD-10-CM

## 2021-02-27 PROCEDURE — 3075F SYST BP GE 130 - 139MM HG: CPT | Performed by: FAMILY MEDICINE

## 2021-02-27 PROCEDURE — 3008F BODY MASS INDEX DOCD: CPT | Performed by: FAMILY MEDICINE

## 2021-02-27 PROCEDURE — 3079F DIAST BP 80-89 MM HG: CPT | Performed by: FAMILY MEDICINE

## 2021-02-27 PROCEDURE — 99214 OFFICE O/P EST MOD 30 MIN: CPT | Performed by: FAMILY MEDICINE

## 2021-02-27 RX ORDER — TAMSULOSIN HYDROCHLORIDE 0.4 MG/1
CAPSULE ORAL
Qty: 90 CAPSULE | Refills: 0 | Status: SHIPPED | OUTPATIENT
Start: 2021-02-27 | End: 2021-05-10

## 2021-02-27 NOTE — PROGRESS NOTES
HPI:    Torrey Brown is a 54year old male who presents for Headache (sinus pressure, minimal drainage )   Patient complains of headaches.    Has had for: few weeks  Description of pain: sinus and frontal   Sudden onset: no  Associated neck pain: mild neck dizziness or headaches. Patient reports no visual disturbances and reports hearing has been about the same. Patient reports no recent injury or trauma. Tension headache with mild sinus drainage.            EXAM:    /84   Pulse 101   Temp 98.3 hypertension  -stable, CPM    3. Mixed hyperlipidemia  -stable, diet controlled. CPM     4. S/P arthroscopic surgery of left knee  -stable, CPM    5. Pre-diabetes  -diet control, will recheck labs in may 2021.   - COMP METABOLIC PANEL (14);  Future  - LIPID

## 2021-05-08 DIAGNOSIS — N40.0 BENIGN PROSTATIC HYPERPLASIA WITHOUT LOWER URINARY TRACT SYMPTOMS: ICD-10-CM

## 2021-05-10 RX ORDER — TAMSULOSIN HYDROCHLORIDE 0.4 MG/1
CAPSULE ORAL
Qty: 90 CAPSULE | Refills: 0 | Status: SHIPPED | OUTPATIENT
Start: 2021-05-10 | End: 2021-08-16

## 2021-08-14 DIAGNOSIS — N40.0 BENIGN PROSTATIC HYPERPLASIA WITHOUT LOWER URINARY TRACT SYMPTOMS: ICD-10-CM

## 2021-08-16 RX ORDER — TAMSULOSIN HYDROCHLORIDE 0.4 MG/1
CAPSULE ORAL
Qty: 90 CAPSULE | Refills: 0 | Status: SHIPPED | OUTPATIENT
Start: 2021-08-16 | End: 2021-11-16

## 2021-08-25 ENCOUNTER — LAB ENCOUNTER (OUTPATIENT)
Dept: LAB | Age: 56
End: 2021-08-25
Attending: ORTHOPAEDIC SURGERY
Payer: COMMERCIAL

## 2021-08-25 DIAGNOSIS — I10 PRIMARY HYPERTENSION: ICD-10-CM

## 2021-08-25 LAB
ANION GAP SERPL CALC-SCNC: 8 MMOL/L (ref 0–18)
BUN BLD-MCNC: 13 MG/DL (ref 7–18)
CALCIUM BLD-MCNC: 9.5 MG/DL (ref 8.5–10.1)
CHLORIDE SERPL-SCNC: 105 MMOL/L (ref 98–112)
CO2 SERPL-SCNC: 26 MMOL/L (ref 21–32)
CREAT BLD-MCNC: 0.88 MG/DL
GLUCOSE BLD-MCNC: 239 MG/DL (ref 70–99)
OSMOLALITY SERPL CALC.SUM OF ELEC: 296 MOSM/KG (ref 275–295)
PATIENT FASTING Y/N/NP: YES
POTASSIUM SERPL-SCNC: 4.2 MMOL/L (ref 3.5–5.1)
SODIUM SERPL-SCNC: 139 MMOL/L (ref 136–145)

## 2021-08-25 PROCEDURE — 36415 COLL VENOUS BLD VENIPUNCTURE: CPT

## 2021-08-25 PROCEDURE — 80048 BASIC METABOLIC PNL TOTAL CA: CPT

## 2021-09-08 PROBLEM — Z98.890 S/P ARTHROSCOPIC SURGERY OF RIGHT KNEE: Status: ACTIVE | Noted: 2021-09-08

## 2021-11-16 DIAGNOSIS — N40.0 BENIGN PROSTATIC HYPERPLASIA WITHOUT LOWER URINARY TRACT SYMPTOMS: ICD-10-CM

## 2021-11-16 RX ORDER — TAMSULOSIN HYDROCHLORIDE 0.4 MG/1
CAPSULE ORAL
Qty: 90 CAPSULE | Refills: 0 | Status: SHIPPED | OUTPATIENT
Start: 2021-11-16

## 2021-12-08 ENCOUNTER — PATIENT MESSAGE (OUTPATIENT)
Dept: FAMILY MEDICINE CLINIC | Facility: CLINIC | Age: 56
End: 2021-12-08

## 2021-12-08 NOTE — TELEPHONE ENCOUNTER
From: Torrey Brown  To: Zonia Montaño MD  Sent: 12/8/2021 3:43 PM CST  Subject: Ferjeanettee Ramya Wade  I was wondering what your opinion is about getting the Covid Vaccine.  Also which one do you recommend over another ie Ricky Avalos or Modern

## 2022-02-15 RX ORDER — TAMSULOSIN HYDROCHLORIDE 0.4 MG/1
CAPSULE ORAL
Qty: 90 CAPSULE | Refills: 0 | Status: SHIPPED | OUTPATIENT
Start: 2022-02-15

## 2022-04-28 LAB
AMB EXT CHOLESTEROL, TOTAL: 161 MG/DL
AMB EXT HDL CHOLESTEROL: 57 MG/DL
AMB EXT HGBA1C: 9.8 %
AMB EXT LDL CHOLESTEROL, DIRECT: 86 MG/DL
AMB EXT TRIGLYCERIDES: 87 MG/DL

## 2022-05-15 DIAGNOSIS — N40.0 BENIGN PROSTATIC HYPERPLASIA WITHOUT LOWER URINARY TRACT SYMPTOMS: ICD-10-CM

## 2022-05-17 RX ORDER — TAMSULOSIN HYDROCHLORIDE 0.4 MG/1
CAPSULE ORAL
Qty: 90 CAPSULE | Refills: 0 | Status: SHIPPED | OUTPATIENT
Start: 2022-05-17

## 2022-05-26 ENCOUNTER — OFFICE VISIT (OUTPATIENT)
Dept: FAMILY MEDICINE CLINIC | Facility: CLINIC | Age: 57
End: 2022-05-26
Payer: COMMERCIAL

## 2022-05-26 VITALS
BODY MASS INDEX: 27.74 KG/M2 | OXYGEN SATURATION: 97 % | DIASTOLIC BLOOD PRESSURE: 80 MMHG | HEIGHT: 68 IN | RESPIRATION RATE: 16 BRPM | WEIGHT: 183 LBS | HEART RATE: 90 BPM | SYSTOLIC BLOOD PRESSURE: 120 MMHG

## 2022-05-26 DIAGNOSIS — Z12.5 SCREENING FOR PROSTATE CANCER: ICD-10-CM

## 2022-05-26 DIAGNOSIS — E78.2 MIXED HYPERLIPIDEMIA: ICD-10-CM

## 2022-05-26 DIAGNOSIS — Z00.00 ROUTINE GENERAL MEDICAL EXAMINATION AT HEALTH CARE FACILITY: Primary | ICD-10-CM

## 2022-05-26 DIAGNOSIS — I10 ESSENTIAL HYPERTENSION: ICD-10-CM

## 2022-05-26 DIAGNOSIS — R73.03 PRE-DIABETES: ICD-10-CM

## 2022-05-31 ENCOUNTER — MED REC SCAN ONLY (OUTPATIENT)
Dept: FAMILY MEDICINE CLINIC | Facility: CLINIC | Age: 57
End: 2022-05-31

## 2022-08-06 DIAGNOSIS — N40.0 BENIGN PROSTATIC HYPERPLASIA WITHOUT LOWER URINARY TRACT SYMPTOMS: ICD-10-CM

## 2022-08-09 RX ORDER — TAMSULOSIN HYDROCHLORIDE 0.4 MG/1
CAPSULE ORAL
Qty: 90 CAPSULE | Refills: 0 | Status: SHIPPED | OUTPATIENT
Start: 2022-08-09

## 2022-09-05 ENCOUNTER — PATIENT MESSAGE (OUTPATIENT)
Dept: FAMILY MEDICINE CLINIC | Facility: CLINIC | Age: 57
End: 2022-09-05

## 2022-09-09 ENCOUNTER — OFFICE VISIT (OUTPATIENT)
Dept: FAMILY MEDICINE CLINIC | Facility: CLINIC | Age: 57
End: 2022-09-09
Payer: COMMERCIAL

## 2022-09-09 VITALS
WEIGHT: 176 LBS | OXYGEN SATURATION: 96 % | RESPIRATION RATE: 16 BRPM | DIASTOLIC BLOOD PRESSURE: 70 MMHG | SYSTOLIC BLOOD PRESSURE: 122 MMHG | BODY MASS INDEX: 26.67 KG/M2 | HEIGHT: 68 IN | HEART RATE: 76 BPM

## 2022-09-09 DIAGNOSIS — E78.2 MIXED HYPERLIPIDEMIA: ICD-10-CM

## 2022-09-09 DIAGNOSIS — R73.03 PRE-DIABETES: Primary | ICD-10-CM

## 2022-09-09 DIAGNOSIS — L23.9 ALLERGIC DERMATITIS: ICD-10-CM

## 2022-09-09 PROCEDURE — 3078F DIAST BP <80 MM HG: CPT | Performed by: FAMILY MEDICINE

## 2022-09-09 PROCEDURE — 3008F BODY MASS INDEX DOCD: CPT | Performed by: FAMILY MEDICINE

## 2022-09-09 PROCEDURE — 3074F SYST BP LT 130 MM HG: CPT | Performed by: FAMILY MEDICINE

## 2022-09-09 PROCEDURE — 99214 OFFICE O/P EST MOD 30 MIN: CPT | Performed by: FAMILY MEDICINE

## 2022-09-10 ENCOUNTER — PATIENT MESSAGE (OUTPATIENT)
Dept: FAMILY MEDICINE CLINIC | Facility: CLINIC | Age: 57
End: 2022-09-10

## 2022-09-12 NOTE — TELEPHONE ENCOUNTER
From: Briseida Combs  To: Michelle Dean MD  Sent: 9/10/2022 8:01 AM CDT  Subject: Prescription     Hello   I saw Dr Asha Varghese yesterday and he was going to prescribe an ointment for my rash. [facility] never got the prescription so could it please be sent over today.  Thanks

## 2022-11-04 DIAGNOSIS — N40.0 BENIGN PROSTATIC HYPERPLASIA WITHOUT LOWER URINARY TRACT SYMPTOMS: ICD-10-CM

## 2022-11-04 RX ORDER — TAMSULOSIN HYDROCHLORIDE 0.4 MG/1
CAPSULE ORAL
Qty: 90 CAPSULE | Refills: 0 | Status: SHIPPED | OUTPATIENT
Start: 2022-11-04

## 2022-11-10 ENCOUNTER — LAB ENCOUNTER (OUTPATIENT)
Dept: LAB | Age: 57
End: 2022-11-10
Attending: FAMILY MEDICINE
Payer: COMMERCIAL

## 2022-11-10 DIAGNOSIS — Z00.00 ROUTINE GENERAL MEDICAL EXAMINATION AT HEALTH CARE FACILITY: ICD-10-CM

## 2022-11-10 DIAGNOSIS — R73.03 PRE-DIABETES: ICD-10-CM

## 2022-11-10 DIAGNOSIS — Z12.5 SCREENING FOR PROSTATE CANCER: ICD-10-CM

## 2022-11-10 LAB
ALBUMIN SERPL-MCNC: 4.1 G/DL (ref 3.4–5)
ALBUMIN/GLOB SERPL: 1.3 {RATIO} (ref 1–2)
ALP LIVER SERPL-CCNC: 53 U/L
ALT SERPL-CCNC: 33 U/L
ANION GAP SERPL CALC-SCNC: 5 MMOL/L (ref 0–18)
AST SERPL-CCNC: 18 U/L (ref 15–37)
BASOPHILS # BLD AUTO: 0.04 X10(3) UL (ref 0–0.2)
BASOPHILS NFR BLD AUTO: 0.7 %
BILIRUB SERPL-MCNC: 1.1 MG/DL (ref 0.1–2)
BUN BLD-MCNC: 16 MG/DL (ref 7–18)
CALCIUM BLD-MCNC: 9.6 MG/DL (ref 8.5–10.1)
CHLORIDE SERPL-SCNC: 105 MMOL/L (ref 98–112)
CHOLEST SERPL-MCNC: 168 MG/DL (ref ?–200)
CO2 SERPL-SCNC: 28 MMOL/L (ref 21–32)
COMPLEXED PSA SERPL-MCNC: 0.69 NG/ML (ref ?–4)
CREAT BLD-MCNC: 0.86 MG/DL
EOSINOPHIL # BLD AUTO: 0.24 X10(3) UL (ref 0–0.7)
EOSINOPHIL NFR BLD AUTO: 4.4 %
ERYTHROCYTE [DISTWIDTH] IN BLOOD BY AUTOMATED COUNT: 11.9 %
EST. AVERAGE GLUCOSE BLD GHB EST-MCNC: 160 MG/DL (ref 68–126)
FASTING PATIENT LIPID ANSWER: YES
FASTING STATUS PATIENT QL REPORTED: YES
GFR SERPLBLD BASED ON 1.73 SQ M-ARVRAT: 101 ML/MIN/1.73M2 (ref 60–?)
GLOBULIN PLAS-MCNC: 3.1 G/DL (ref 2.8–4.4)
GLUCOSE BLD-MCNC: 154 MG/DL (ref 70–99)
HBA1C MFR BLD: 7.2 % (ref ?–5.7)
HCT VFR BLD AUTO: 45.7 %
HDLC SERPL-MCNC: 67 MG/DL (ref 40–59)
HGB BLD-MCNC: 15.5 G/DL
IMM GRANULOCYTES # BLD AUTO: 0.01 X10(3) UL (ref 0–1)
IMM GRANULOCYTES NFR BLD: 0.2 %
LDLC SERPL CALC-MCNC: 88 MG/DL (ref ?–100)
LYMPHOCYTES # BLD AUTO: 1.46 X10(3) UL (ref 1–4)
LYMPHOCYTES NFR BLD AUTO: 26.6 %
MCH RBC QN AUTO: 32 PG (ref 26–34)
MCHC RBC AUTO-ENTMCNC: 33.9 G/DL (ref 31–37)
MCV RBC AUTO: 94.2 FL
MONOCYTES # BLD AUTO: 0.52 X10(3) UL (ref 0.1–1)
MONOCYTES NFR BLD AUTO: 9.5 %
NEUTROPHILS # BLD AUTO: 3.21 X10 (3) UL (ref 1.5–7.7)
NEUTROPHILS # BLD AUTO: 3.21 X10(3) UL (ref 1.5–7.7)
NEUTROPHILS NFR BLD AUTO: 58.6 %
NONHDLC SERPL-MCNC: 101 MG/DL (ref ?–130)
OSMOLALITY SERPL CALC.SUM OF ELEC: 290 MOSM/KG (ref 275–295)
PLATELET # BLD AUTO: 193 10(3)UL (ref 150–450)
POTASSIUM SERPL-SCNC: 4.1 MMOL/L (ref 3.5–5.1)
PROT SERPL-MCNC: 7.2 G/DL (ref 6.4–8.2)
RBC # BLD AUTO: 4.85 X10(6)UL
SODIUM SERPL-SCNC: 138 MMOL/L (ref 136–145)
TRIGL SERPL-MCNC: 69 MG/DL (ref 30–149)
TSI SER-ACNC: 1.25 MIU/ML (ref 0.36–3.74)
VLDLC SERPL CALC-MCNC: 11 MG/DL (ref 0–30)
WBC # BLD AUTO: 5.5 X10(3) UL (ref 4–11)

## 2022-11-10 PROCEDURE — 36415 COLL VENOUS BLD VENIPUNCTURE: CPT

## 2022-11-10 PROCEDURE — 84443 ASSAY THYROID STIM HORMONE: CPT

## 2022-11-10 PROCEDURE — 85025 COMPLETE CBC W/AUTO DIFF WBC: CPT

## 2022-11-10 PROCEDURE — 80053 COMPREHEN METABOLIC PANEL: CPT

## 2022-11-10 PROCEDURE — 83036 HEMOGLOBIN GLYCOSYLATED A1C: CPT

## 2022-11-10 PROCEDURE — 80061 LIPID PANEL: CPT

## 2022-11-14 DIAGNOSIS — E78.2 MIXED HYPERLIPIDEMIA: Primary | ICD-10-CM

## 2022-11-14 DIAGNOSIS — E11.9 TYPE 2 DIABETES MELLITUS WITHOUT COMPLICATION, WITHOUT LONG-TERM CURRENT USE OF INSULIN (HCC): ICD-10-CM

## 2022-11-14 DIAGNOSIS — R73.03 PRE-DIABETES: ICD-10-CM

## 2023-01-04 ENCOUNTER — E-VISIT (OUTPATIENT)
Dept: TELEHEALTH | Age: 58
End: 2023-01-04

## 2023-01-04 DIAGNOSIS — Z02.9 ENCOUNTERS FOR ADMINISTRATIVE PURPOSES: Primary | ICD-10-CM

## 2023-02-02 DIAGNOSIS — N40.0 BENIGN PROSTATIC HYPERPLASIA WITHOUT LOWER URINARY TRACT SYMPTOMS: ICD-10-CM

## 2023-02-04 RX ORDER — TAMSULOSIN HYDROCHLORIDE 0.4 MG/1
CAPSULE ORAL
Qty: 90 CAPSULE | Refills: 0 | Status: SHIPPED | OUTPATIENT
Start: 2023-02-04

## 2023-04-25 ENCOUNTER — PATIENT MESSAGE (OUTPATIENT)
Dept: FAMILY MEDICINE CLINIC | Facility: CLINIC | Age: 58
End: 2023-04-25

## 2023-04-26 NOTE — TELEPHONE ENCOUNTER
From: Michelle Rodriguez  To: Sae Rodriguez MD  Sent: 4/25/2023 6:44 PM CDT  Subject: [place] [facility] [address] Dell Mireles  I was wondering what your thoughts are about the possibility of acquiring a medical marijuana card. I have constant pain in my right knee despite getting cortisone shots and recently doing the 3 shot Eufflexa. I have had little to now no relief. Please let me know if I need to schedule an appointment?    Thanks  Olya Huff

## 2023-05-03 DIAGNOSIS — N40.0 BENIGN PROSTATIC HYPERPLASIA WITHOUT LOWER URINARY TRACT SYMPTOMS: ICD-10-CM

## 2023-05-04 RX ORDER — TAMSULOSIN HYDROCHLORIDE 0.4 MG/1
CAPSULE ORAL
Qty: 90 CAPSULE | Refills: 0 | Status: SHIPPED | OUTPATIENT
Start: 2023-05-04

## 2023-06-08 ENCOUNTER — OFFICE VISIT (OUTPATIENT)
Dept: FAMILY MEDICINE CLINIC | Facility: CLINIC | Age: 58
End: 2023-06-08
Payer: COMMERCIAL

## 2023-06-08 VITALS
WEIGHT: 176 LBS | SYSTOLIC BLOOD PRESSURE: 120 MMHG | RESPIRATION RATE: 16 BRPM | DIASTOLIC BLOOD PRESSURE: 80 MMHG | HEIGHT: 68 IN | BODY MASS INDEX: 26.67 KG/M2 | HEART RATE: 83 BPM | OXYGEN SATURATION: 96 %

## 2023-06-08 DIAGNOSIS — R73.03 PRE-DIABETES: ICD-10-CM

## 2023-06-08 DIAGNOSIS — L30.9 DERMATITIS: ICD-10-CM

## 2023-06-08 DIAGNOSIS — Z00.00 ANNUAL PHYSICAL EXAM: Primary | ICD-10-CM

## 2023-06-08 DIAGNOSIS — I10 ESSENTIAL HYPERTENSION: ICD-10-CM

## 2023-06-08 DIAGNOSIS — E78.2 MIXED HYPERLIPIDEMIA: ICD-10-CM

## 2023-06-08 DIAGNOSIS — M23.91 INTERNAL DERANGEMENT OF KNEE, RIGHT: ICD-10-CM

## 2023-06-08 PROCEDURE — 3008F BODY MASS INDEX DOCD: CPT | Performed by: FAMILY MEDICINE

## 2023-06-08 PROCEDURE — 99396 PREV VISIT EST AGE 40-64: CPT | Performed by: FAMILY MEDICINE

## 2023-06-08 PROCEDURE — 3079F DIAST BP 80-89 MM HG: CPT | Performed by: FAMILY MEDICINE

## 2023-06-08 PROCEDURE — 3074F SYST BP LT 130 MM HG: CPT | Performed by: FAMILY MEDICINE

## 2023-06-08 PROCEDURE — 99214 OFFICE O/P EST MOD 30 MIN: CPT | Performed by: FAMILY MEDICINE

## 2023-06-08 RX ORDER — TRIAMCINOLONE ACETONIDE 1 MG/G
CREAM TOPICAL 2 TIMES DAILY PRN
Qty: 30 G | Refills: 0 | Status: SHIPPED | OUTPATIENT
Start: 2023-06-08 | End: 2023-06-15

## 2023-06-08 RX ORDER — DICLOFENAC SODIUM 75 MG/1
75 TABLET, DELAYED RELEASE ORAL 2 TIMES DAILY PRN
Qty: 60 TABLET | Refills: 1 | Status: SHIPPED | OUTPATIENT
Start: 2023-06-08

## 2023-06-12 ENCOUNTER — PATIENT MESSAGE (OUTPATIENT)
Dept: FAMILY MEDICINE CLINIC | Facility: CLINIC | Age: 58
End: 2023-06-12

## 2023-06-12 DIAGNOSIS — R30.9 PAINFUL URINATION: Primary | ICD-10-CM

## 2023-06-12 NOTE — TELEPHONE ENCOUNTER
Pt forgot to mention stinging pain from urethra throughout the day in addition to upon urination. LOV 6/8/23. triamcinolone has helped some. Please advise.

## 2023-06-16 ENCOUNTER — LABORATORY ENCOUNTER (OUTPATIENT)
Dept: LAB | Age: 58
End: 2023-06-16
Attending: FAMILY MEDICINE
Payer: COMMERCIAL

## 2023-06-16 DIAGNOSIS — R30.9 PAINFUL URINATION: ICD-10-CM

## 2023-06-16 DIAGNOSIS — R73.03 PRE-DIABETES: ICD-10-CM

## 2023-06-16 LAB
CREAT UR-SCNC: <13 MG/DL
MICROALBUMIN UR-MCNC: <0.5 MG/DL

## 2023-06-16 PROCEDURE — 3061F NEG MICROALBUMINURIA REV: CPT | Performed by: FAMILY MEDICINE

## 2023-06-16 PROCEDURE — 87086 URINE CULTURE/COLONY COUNT: CPT

## 2023-06-16 PROCEDURE — 82043 UR ALBUMIN QUANTITATIVE: CPT

## 2023-06-16 PROCEDURE — 82570 ASSAY OF URINE CREATININE: CPT

## 2023-06-17 ENCOUNTER — PATIENT MESSAGE (OUTPATIENT)
Dept: FAMILY MEDICINE CLINIC | Facility: CLINIC | Age: 58
End: 2023-06-17

## 2023-06-18 ENCOUNTER — PATIENT MESSAGE (OUTPATIENT)
Dept: FAMILY MEDICINE CLINIC | Facility: CLINIC | Age: 58
End: 2023-06-18

## 2023-06-20 ENCOUNTER — PATIENT MESSAGE (OUTPATIENT)
Dept: FAMILY MEDICINE CLINIC | Facility: CLINIC | Age: 58
End: 2023-06-20

## 2023-06-20 NOTE — TELEPHONE ENCOUNTER
From: Katelyn Juarez  To: Christina Hendrix MD  Sent: 6/20/2023 5:00 PM CDT  Subject: Test Results     Hi   What is the prognosis based on the test results? Looking for some type of resolution as test was last Thursday?  Thanks

## 2023-06-22 ENCOUNTER — TELEPHONE (OUTPATIENT)
Dept: FAMILY MEDICINE CLINIC | Facility: CLINIC | Age: 58
End: 2023-06-22

## 2023-06-22 NOTE — TELEPHONE ENCOUNTER
Pt has scheduled OV for 7/6 but asks if he should just see Urology. Labs released, but pt. Requesting call back from RN in alt. TE . Please advise.

## 2023-06-22 NOTE — TELEPHONE ENCOUNTER
Patient is calling back to check to speak with a nurse regarding his Urine/Lab results.      Please advise

## 2023-06-23 NOTE — TELEPHONE ENCOUNTER
Left message for the patient to call the office back.  (Need to clarify symptoms that he is having.)

## 2023-06-29 ENCOUNTER — OFFICE VISIT (OUTPATIENT)
Dept: FAMILY MEDICINE CLINIC | Facility: CLINIC | Age: 58
End: 2023-06-29
Payer: COMMERCIAL

## 2023-06-29 VITALS
HEART RATE: 116 BPM | DIASTOLIC BLOOD PRESSURE: 70 MMHG | WEIGHT: 177 LBS | RESPIRATION RATE: 16 BRPM | SYSTOLIC BLOOD PRESSURE: 118 MMHG | BODY MASS INDEX: 26.83 KG/M2 | HEIGHT: 68 IN | OXYGEN SATURATION: 98 %

## 2023-06-29 DIAGNOSIS — H02.826 EYELID CYST, LEFT: ICD-10-CM

## 2023-06-29 DIAGNOSIS — I10 ESSENTIAL HYPERTENSION: ICD-10-CM

## 2023-06-29 DIAGNOSIS — R35.89 POLYURIA: Primary | ICD-10-CM

## 2023-06-29 DIAGNOSIS — R73.03 PRE-DIABETES: ICD-10-CM

## 2023-06-29 PROCEDURE — 3078F DIAST BP <80 MM HG: CPT | Performed by: FAMILY MEDICINE

## 2023-06-29 PROCEDURE — 99214 OFFICE O/P EST MOD 30 MIN: CPT | Performed by: FAMILY MEDICINE

## 2023-06-29 PROCEDURE — 3074F SYST BP LT 130 MM HG: CPT | Performed by: FAMILY MEDICINE

## 2023-06-29 PROCEDURE — 3008F BODY MASS INDEX DOCD: CPT | Performed by: FAMILY MEDICINE

## 2023-07-12 ENCOUNTER — LABORATORY ENCOUNTER (OUTPATIENT)
Dept: LAB | Age: 58
End: 2023-07-12
Attending: FAMILY MEDICINE
Payer: COMMERCIAL

## 2023-07-12 DIAGNOSIS — R73.03 PRE-DIABETES: ICD-10-CM

## 2023-07-12 LAB
ALBUMIN SERPL-MCNC: 4.4 G/DL (ref 3.4–5)
ALBUMIN/GLOB SERPL: 2.1 {RATIO} (ref 1–2)
ALP LIVER SERPL-CCNC: 55 U/L
ALT SERPL-CCNC: 37 U/L
ANION GAP SERPL CALC-SCNC: 5 MMOL/L (ref 0–18)
AST SERPL-CCNC: 24 U/L (ref 15–37)
BILIRUB SERPL-MCNC: 0.8 MG/DL (ref 0.1–2)
BUN BLD-MCNC: 18 MG/DL (ref 7–18)
CALCIUM BLD-MCNC: 9.1 MG/DL (ref 8.5–10.1)
CHLORIDE SERPL-SCNC: 103 MMOL/L (ref 98–112)
CHOLEST SERPL-MCNC: 173 MG/DL (ref ?–200)
CO2 SERPL-SCNC: 28 MMOL/L (ref 21–32)
CREAT BLD-MCNC: 0.96 MG/DL
EST. AVERAGE GLUCOSE BLD GHB EST-MCNC: 177 MG/DL (ref 68–126)
FASTING PATIENT LIPID ANSWER: YES
FASTING STATUS PATIENT QL REPORTED: YES
GFR SERPLBLD BASED ON 1.73 SQ M-ARVRAT: 92 ML/MIN/1.73M2 (ref 60–?)
GLOBULIN PLAS-MCNC: 2.1 G/DL (ref 2.8–4.4)
GLUCOSE BLD-MCNC: 177 MG/DL (ref 70–99)
HBA1C MFR BLD: 7.8 % (ref ?–5.7)
HDLC SERPL-MCNC: 74 MG/DL (ref 40–59)
LDLC SERPL CALC-MCNC: 89 MG/DL (ref ?–100)
NONHDLC SERPL-MCNC: 99 MG/DL (ref ?–130)
OSMOLALITY SERPL CALC.SUM OF ELEC: 288 MOSM/KG (ref 275–295)
POTASSIUM SERPL-SCNC: 4.2 MMOL/L (ref 3.5–5.1)
PROT SERPL-MCNC: 6.5 G/DL (ref 6.4–8.2)
SODIUM SERPL-SCNC: 136 MMOL/L (ref 136–145)
TRIGL SERPL-MCNC: 50 MG/DL (ref 30–149)
VLDLC SERPL CALC-MCNC: 8 MG/DL (ref 0–30)

## 2023-07-12 PROCEDURE — 3051F HG A1C>EQUAL 7.0%<8.0%: CPT | Performed by: FAMILY MEDICINE

## 2023-07-12 PROCEDURE — 83036 HEMOGLOBIN GLYCOSYLATED A1C: CPT

## 2023-07-12 PROCEDURE — 80053 COMPREHEN METABOLIC PANEL: CPT

## 2023-07-12 PROCEDURE — 36415 COLL VENOUS BLD VENIPUNCTURE: CPT

## 2023-07-12 PROCEDURE — 80061 LIPID PANEL: CPT

## 2023-07-17 ENCOUNTER — PATIENT MESSAGE (OUTPATIENT)
Dept: FAMILY MEDICINE CLINIC | Facility: CLINIC | Age: 58
End: 2023-07-17

## 2023-07-17 NOTE — TELEPHONE ENCOUNTER
From: Jocelyn Dykes  To: Clara Whitmore MD  Sent: 7/17/2023 8:03 AM CDT  Subject: Test Results/Questions    Micheline Hernandez    Do I need to schedule an appointment to go over my recent test results with you or are they ok? Also I need to ask who you might recommend to have my Dad tested for Alzeimers or Dementia related symptoms so we can get him the right care potentially through insurance and possible medication . I do have Medical power of  to make this decision. Could you possibly give me a call later today to discuss as I know he has an appointment with you this Thursday.    [phone] Cell Phone  Thanks  Gianna Bunch

## 2023-07-20 RX ORDER — METFORMIN HYDROCHLORIDE 750 MG/1
750 TABLET, EXTENDED RELEASE ORAL 2 TIMES DAILY WITH MEALS
Qty: 60 TABLET | Refills: 2 | Status: SHIPPED | OUTPATIENT
Start: 2023-07-20

## 2023-07-20 NOTE — TELEPHONE ENCOUNTER
Sugars are worse, I recommend him starting Metformin  mg PO BID, I sent it over, needs recheck diabetic labs in 3 months.

## 2023-08-02 DIAGNOSIS — N40.0 BENIGN PROSTATIC HYPERPLASIA WITHOUT LOWER URINARY TRACT SYMPTOMS: ICD-10-CM

## 2023-08-02 NOTE — TELEPHONE ENCOUNTER
Received a fax from [facility] requesting a refill on tamsulosin. LOV 6/29/2023. LF 5/4/2023. Please approve or deny pending rx. Thank you.

## 2023-08-04 RX ORDER — TAMSULOSIN HYDROCHLORIDE 0.4 MG/1
0.4 CAPSULE ORAL DAILY
Qty: 90 CAPSULE | Refills: 1 | Status: SHIPPED | OUTPATIENT
Start: 2023-08-04

## 2023-11-09 ENCOUNTER — OFFICE VISIT (OUTPATIENT)
Dept: FAMILY MEDICINE CLINIC | Facility: CLINIC | Age: 58
End: 2023-11-09
Payer: COMMERCIAL

## 2023-11-09 VITALS
SYSTOLIC BLOOD PRESSURE: 136 MMHG | TEMPERATURE: 98 F | RESPIRATION RATE: 14 BRPM | DIASTOLIC BLOOD PRESSURE: 80 MMHG | HEART RATE: 95 BPM | OXYGEN SATURATION: 97 % | BODY MASS INDEX: 27.47 KG/M2 | HEIGHT: 67 IN | WEIGHT: 175 LBS

## 2023-11-09 DIAGNOSIS — E78.2 MIXED HYPERLIPIDEMIA: ICD-10-CM

## 2023-11-09 DIAGNOSIS — R39.11 URINARY HESITANCY: ICD-10-CM

## 2023-11-09 DIAGNOSIS — R73.03 PRE-DIABETES: Primary | ICD-10-CM

## 2023-11-09 PROBLEM — M17.11 PRIMARY OSTEOARTHRITIS OF RIGHT KNEE: Status: ACTIVE | Noted: 2022-06-08

## 2023-11-09 PROCEDURE — 99214 OFFICE O/P EST MOD 30 MIN: CPT | Performed by: FAMILY MEDICINE

## 2023-11-09 PROCEDURE — 3079F DIAST BP 80-89 MM HG: CPT | Performed by: FAMILY MEDICINE

## 2023-11-09 PROCEDURE — 3075F SYST BP GE 130 - 139MM HG: CPT | Performed by: FAMILY MEDICINE

## 2023-11-09 PROCEDURE — 3072F LOW RISK FOR RETINOPATHY: CPT | Performed by: FAMILY MEDICINE

## 2023-11-09 PROCEDURE — 3008F BODY MASS INDEX DOCD: CPT | Performed by: FAMILY MEDICINE

## 2023-11-09 RX ORDER — MELOXICAM 15 MG/1
15 TABLET ORAL DAILY
COMMUNITY
Start: 2022-06-08 | End: 2023-11-09 | Stop reason: ALTCHOICE

## 2023-11-09 RX ORDER — PREDNISONE 20 MG/1
TABLET ORAL DAILY
COMMUNITY
Start: 2022-09-07 | End: 2023-11-09 | Stop reason: ALTCHOICE

## 2023-11-09 RX ORDER — TRAMADOL HYDROCHLORIDE 50 MG/1
TABLET ORAL EVERY 8 HOURS PRN
COMMUNITY
Start: 2022-09-08 | End: 2023-11-09 | Stop reason: ALTCHOICE

## 2023-11-16 ENCOUNTER — LAB ENCOUNTER (OUTPATIENT)
Dept: LAB | Age: 58
End: 2023-11-16
Attending: FAMILY MEDICINE
Payer: COMMERCIAL

## 2023-11-16 ENCOUNTER — HOSPITAL ENCOUNTER (OUTPATIENT)
Dept: ULTRASOUND IMAGING | Age: 58
Discharge: HOME | End: 2023-11-16
Attending: FAMILY MEDICINE
Payer: COMMERCIAL

## 2023-11-16 DIAGNOSIS — R73.03 PRE-DIABETES: Primary | ICD-10-CM

## 2023-11-16 DIAGNOSIS — R73.03 PRE-DIABETES: ICD-10-CM

## 2023-11-16 DIAGNOSIS — E78.2 MIXED HYPERLIPIDEMIA: ICD-10-CM

## 2023-11-16 DIAGNOSIS — R39.11 URINARY HESITANCY: ICD-10-CM

## 2023-11-16 LAB
EST. AVERAGE GLUCOSE BLD GHB EST-MCNC: 189 MG/DL (ref 68–126)
HBA1C MFR BLD: 8.2 % (ref ?–5.7)

## 2023-11-16 PROCEDURE — 76770 US EXAM ABDO BACK WALL COMP: CPT | Performed by: FAMILY MEDICINE

## 2023-11-16 PROCEDURE — 84443 ASSAY THYROID STIM HORMONE: CPT

## 2023-11-16 PROCEDURE — 80053 COMPREHEN METABOLIC PANEL: CPT

## 2023-11-16 PROCEDURE — 83036 HEMOGLOBIN GLYCOSYLATED A1C: CPT

## 2023-11-16 PROCEDURE — 80061 LIPID PANEL: CPT

## 2023-11-16 PROCEDURE — 84439 ASSAY OF FREE THYROXINE: CPT

## 2023-11-16 PROCEDURE — 36415 COLL VENOUS BLD VENIPUNCTURE: CPT

## 2023-11-17 LAB
ALBUMIN SERPL-MCNC: 4.2 G/DL (ref 3.4–5)
ALBUMIN/GLOB SERPL: 1.4 {RATIO} (ref 1–2)
ALP LIVER SERPL-CCNC: 57 U/L
ALT SERPL-CCNC: 31 U/L
ANION GAP SERPL CALC-SCNC: 3 MMOL/L (ref 0–18)
AST SERPL-CCNC: 20 U/L (ref 15–37)
BILIRUB SERPL-MCNC: 0.8 MG/DL (ref 0.1–2)
BUN BLD-MCNC: 13 MG/DL (ref 9–23)
CALCIUM BLD-MCNC: 9.1 MG/DL (ref 8.5–10.1)
CHLORIDE SERPL-SCNC: 105 MMOL/L (ref 98–112)
CHOLEST SERPL-MCNC: 143 MG/DL (ref ?–200)
CO2 SERPL-SCNC: 30 MMOL/L (ref 21–32)
CREAT BLD-MCNC: 0.91 MG/DL
EGFRCR SERPLBLD CKD-EPI 2021: 98 ML/MIN/1.73M2 (ref 60–?)
FASTING PATIENT LIPID ANSWER: YES
FASTING STATUS PATIENT QL REPORTED: YES
GLOBULIN PLAS-MCNC: 2.9 G/DL (ref 2.8–4.4)
GLUCOSE BLD-MCNC: 165 MG/DL (ref 70–99)
HDLC SERPL-MCNC: 66 MG/DL (ref 40–59)
LDLC SERPL CALC-MCNC: 64 MG/DL (ref ?–100)
NONHDLC SERPL-MCNC: 77 MG/DL (ref ?–130)
OSMOLALITY SERPL CALC.SUM OF ELEC: 290 MOSM/KG (ref 275–295)
POTASSIUM SERPL-SCNC: 4.2 MMOL/L (ref 3.5–5.1)
PROT SERPL-MCNC: 7.1 G/DL (ref 6.4–8.2)
SODIUM SERPL-SCNC: 138 MMOL/L (ref 136–145)
T4 FREE SERPL-MCNC: 1 NG/DL (ref 0.8–1.7)
TRIGL SERPL-MCNC: 60 MG/DL (ref 30–149)
TSI SER-ACNC: 1.03 MIU/ML (ref 0.36–3.74)
VLDLC SERPL CALC-MCNC: 9 MG/DL (ref 0–30)

## 2023-11-29 ENCOUNTER — TELEPHONE (OUTPATIENT)
Dept: FAMILY MEDICINE CLINIC | Facility: CLINIC | Age: 58
End: 2023-11-29

## 2023-11-29 DIAGNOSIS — E78.2 MIXED HYPERLIPIDEMIA: Primary | ICD-10-CM

## 2023-11-29 DIAGNOSIS — R73.03 PRE-DIABETES: ICD-10-CM

## 2023-11-29 RX ORDER — METFORMIN HYDROCHLORIDE 750 MG/1
750 TABLET, EXTENDED RELEASE ORAL 2 TIMES DAILY WITH MEALS
Qty: 60 TABLET | Refills: 3 | Status: SHIPPED | OUTPATIENT
Start: 2023-11-29

## 2023-11-29 NOTE — TELEPHONE ENCOUNTER
----- Message from Carolina Zee MD sent at 11/25/2023  3:56 PM CST -----  Sugars are not under control and has tried diet control for diabetes-now, should take metformin 750 mg ER BID recheck diabetic labs in 3 months.

## 2024-02-09 ENCOUNTER — PATIENT MESSAGE (OUTPATIENT)
Dept: FAMILY MEDICINE CLINIC | Facility: CLINIC | Age: 59
End: 2024-02-09

## 2024-02-09 NOTE — TELEPHONE ENCOUNTER
From: Jose Hall  To: Jermaine Byrd  Sent: 2/9/2024 1:28 PM CST  Subject: DNR Document     Hello  How do I access a DNR document to have on file for my Father?   Thanks   Jose

## 2024-04-29 ENCOUNTER — PATIENT MESSAGE (OUTPATIENT)
Dept: FAMILY MEDICINE CLINIC | Facility: CLINIC | Age: 59
End: 2024-04-29

## 2024-04-29 NOTE — TELEPHONE ENCOUNTER
From: Jose Hall  To: Jermaine Byrd  Sent: 4/29/2024 9:34 AM CDT  Subject: FMLA Intermittent Leave    Hello Dr Dean    Could you please give me a call when you get a chance. I have a couple questions about getting a FMLA packet filled out by you for work. 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 I can drop off packet but if you could give me a call unless you need me to schedule an appointment which I can do.   Thanks   Jose

## 2024-05-02 ENCOUNTER — OFFICE VISIT (OUTPATIENT)
Dept: FAMILY MEDICINE CLINIC | Facility: CLINIC | Age: 59
End: 2024-05-02
Payer: COMMERCIAL

## 2024-05-02 VITALS
HEART RATE: 76 BPM | BODY MASS INDEX: 26.06 KG/M2 | SYSTOLIC BLOOD PRESSURE: 138 MMHG | OXYGEN SATURATION: 95 % | HEIGHT: 67 IN | DIASTOLIC BLOOD PRESSURE: 80 MMHG | WEIGHT: 166 LBS | RESPIRATION RATE: 12 BRPM

## 2024-05-02 DIAGNOSIS — E78.2 MIXED HYPERLIPIDEMIA: ICD-10-CM

## 2024-05-02 DIAGNOSIS — Z63.6 CAREGIVER BURDEN: ICD-10-CM

## 2024-05-02 DIAGNOSIS — E11.9 TYPE 2 DIABETES MELLITUS WITHOUT COMPLICATION, WITHOUT LONG-TERM CURRENT USE OF INSULIN (HCC): Primary | ICD-10-CM

## 2024-05-02 DIAGNOSIS — I10 ESSENTIAL HYPERTENSION: ICD-10-CM

## 2024-05-02 PROCEDURE — 99214 OFFICE O/P EST MOD 30 MIN: CPT | Performed by: FAMILY MEDICINE

## 2024-05-02 PROCEDURE — 3079F DIAST BP 80-89 MM HG: CPT | Performed by: FAMILY MEDICINE

## 2024-05-02 PROCEDURE — 3075F SYST BP GE 130 - 139MM HG: CPT | Performed by: FAMILY MEDICINE

## 2024-05-02 PROCEDURE — 3008F BODY MASS INDEX DOCD: CPT | Performed by: FAMILY MEDICINE

## 2024-05-02 SDOH — SOCIAL STABILITY - SOCIAL INSECURITY: DEPENDENT RELATIVE NEEDING CARE AT HOME: Z63.6

## 2024-05-03 NOTE — PROGRESS NOTES
HPI:    Jose Hall is a 58 year old male who presents for Physical (Pt is req potential FMLA for Dad Stephen Hall. ) and Follow - Up (No concerns/)   Needs FMLA-discussion about intermittent FMLA, once a week for 4 times a month   Patient presents for recheck of his hypertension. Pt has been taking medications as instructed, no medication side effects, home BP monitoring in the range of 120's systolic and 70's diastolic.   Lab Results   Component Value Date    A1C 8.2 (H) 11/16/2023    A1C 7.8 (H) 07/12/2023    A1C 7.2 (H) 11/10/2022    A1C 9.8 04/28/2022    A1C 6.3 (H) 11/11/2019       Patient's presenting for diabetes check.  Patient has been compliant with medication and diet.  Patient's last OPTHO exam was less than 1 year ago.  He reports no foot ulcers and reports normal sensation to lower extremities.   He reports his sugars have been running in 120's  He denies any hypoglycemic episodes and reports no urinary complaints.      Past History:   He  has a past medical history of Allergic rhinitis (Not sure), Arthritis (2019), and Other and unspecified hyperlipidemia.   He  has a past surgical history that includes knee scope,med/lat menisectomy (Left, 08/10/2015); colonoscopy (04/2016); colonoscopy,diagnostic (N/A, 04/08/2016); colonoscopy (2015); tonsillectomy (Not sure); and other surgical history (2016,2021).   His family history includes Cancer in his maternal grandmother and mother.   He  reports that he quit smoking about 30 years ago. His smoking use included cigarettes. He has never used smokeless tobacco. He reports current alcohol use of about 6.0 standard drinks of alcohol per week. He reports that he does not use drugs.     He is not on any long-term medications.   He has No Known Allergies.     Current Outpatient Medications on File Prior to Visit   Medication Sig    tamsulosin 0.4 MG Oral Cap Take 1 capsule (0.4 mg total) by mouth daily.    Doxycycline Hyclate 20 MG Oral Tab Take 1 tablet  (20 mg total) by mouth daily.    rosuvastatin 20 MG Oral Tab Take 1 tablet (20 mg total) by mouth nightly.    metFORMIN  MG Oral Tablet 24 Hr Take 1 tablet (750 mg total) by mouth 2 (two) times daily with meals. (Patient not taking: Reported on 5/2/2024)     No current facility-administered medications on file prior to visit.         REVIEW OF SYSTEMS:   Patient denies shortness of breath, denies chest pain and denies any recent fevers or chills.    Patient reports no urinary complaints and denies headaches or visual disturbances.   Patient denies any abdominal pain at this time. Patient has no new skin lesions.  Patient reports no acute back pain and reports no dizziness or headaches.   Patient reports no visual disturbances and reports hearing has been about the same.   Patient reports no recent injury or trauma.               EXAM:    /80   Pulse 76   Resp 12   Ht 5' 7\" (1.702 m)   Wt 166 lb (75.3 kg)   SpO2 95%   BMI 26.00 kg/m²  Estimated body mass index is 26 kg/m² as calculated from the following:    Height as of this encounter: 5' 7\" (1.702 m).    Weight as of this encounter: 166 lb (75.3 kg).    General Appearance:  Alert, cooperative, no distress, appears stated age   Head:  Normocephalic, without obvious abnormality, atraumatic   Eyes:  conjunctiva/cornea is not erythematous.        Nose: No nasal drainage.    Throat: No erythema    Neck: Supple, symmetrical, trachea midline, and normal ROM  thyroid: no obvious nodules   Back:   Symmetric, no curvature, ROM normal, no CVA tenderness   Lungs:   Clear to auscultation bilaterally, respirations unlabored   Chest Wall:  No tenderness or deformity   Heart:  Regular rate and rhythm, S1, S2 normal, no murmur,   Abdomen:   Soft, non-tender, bowel sounds active. No hernia.    Genitalia:     Rectal:     Extremities: Extremities normal, atraumatic, no cyanosis or edema   Pulses: 2+ and symmetric   Skin: Skin color, texture, turgor normal, no new  rashes    Lymph nodes: No obvious cervical adenopathy.    Neurologic and psych: Normal speech, Alert and oriented x 3.   Normal mood, normal insight and judgment.                    ASSESSMENT AND PLAN:   Diagnoses and all orders for this visit:    1. Type 2 diabetes mellitus without complication, without long-term current use of insulin (HCC)  -stable, CPM    2. Essential hypertension  -stable, CPM    3. Mixed hyperlipidemia  -stable, CPM    4. Caregiver burden  -discussed with patient to have FMLA to take care of father with worsening dementia requiring a lot more medical needs and supervision.

## 2024-06-17 ENCOUNTER — OFFICE VISIT (OUTPATIENT)
Dept: FAMILY MEDICINE CLINIC | Facility: CLINIC | Age: 59
End: 2024-06-17

## 2024-06-17 ENCOUNTER — LAB ENCOUNTER (OUTPATIENT)
Dept: LAB | Age: 59
End: 2024-06-17
Attending: FAMILY MEDICINE

## 2024-06-17 VITALS
HEIGHT: 67 IN | RESPIRATION RATE: 14 BRPM | TEMPERATURE: 98 F | OXYGEN SATURATION: 98 % | BODY MASS INDEX: 25.74 KG/M2 | DIASTOLIC BLOOD PRESSURE: 70 MMHG | WEIGHT: 164 LBS | HEART RATE: 79 BPM | SYSTOLIC BLOOD PRESSURE: 136 MMHG

## 2024-06-17 DIAGNOSIS — Z00.00 ANNUAL PHYSICAL EXAM: Primary | ICD-10-CM

## 2024-06-17 DIAGNOSIS — R73.03 PRE-DIABETES: ICD-10-CM

## 2024-06-17 DIAGNOSIS — E11.9 TYPE 2 DIABETES MELLITUS WITHOUT COMPLICATION, WITHOUT LONG-TERM CURRENT USE OF INSULIN (HCC): ICD-10-CM

## 2024-06-17 DIAGNOSIS — E78.2 MIXED HYPERLIPIDEMIA: ICD-10-CM

## 2024-06-17 DIAGNOSIS — N40.0 BENIGN PROSTATIC HYPERPLASIA WITHOUT LOWER URINARY TRACT SYMPTOMS: ICD-10-CM

## 2024-06-17 LAB
ALBUMIN SERPL-MCNC: 4.2 G/DL (ref 3.4–5)
ALBUMIN/GLOB SERPL: 1.4 {RATIO} (ref 1–2)
ALP LIVER SERPL-CCNC: 64 U/L
ALT SERPL-CCNC: 26 U/L
ANION GAP SERPL CALC-SCNC: 8 MMOL/L (ref 0–18)
AST SERPL-CCNC: 16 U/L (ref 15–37)
BILIRUB SERPL-MCNC: 0.7 MG/DL (ref 0.1–2)
BUN BLD-MCNC: 13 MG/DL (ref 9–23)
CALCIUM BLD-MCNC: 9.6 MG/DL (ref 8.5–10.1)
CHLORIDE SERPL-SCNC: 101 MMOL/L (ref 98–112)
CHOLEST SERPL-MCNC: 163 MG/DL (ref ?–200)
CO2 SERPL-SCNC: 28 MMOL/L (ref 21–32)
CREAT BLD-MCNC: 0.88 MG/DL
CREAT UR-SCNC: 110 MG/DL
EGFRCR SERPLBLD CKD-EPI 2021: 100 ML/MIN/1.73M2 (ref 60–?)
EST. AVERAGE GLUCOSE BLD GHB EST-MCNC: 174 MG/DL (ref 68–126)
FASTING PATIENT LIPID ANSWER: NO
FASTING STATUS PATIENT QL REPORTED: NO
GLOBULIN PLAS-MCNC: 2.9 G/DL (ref 2.8–4.4)
GLUCOSE BLD-MCNC: 199 MG/DL (ref 70–99)
HBA1C MFR BLD: 7.7 % (ref ?–5.7)
HDLC SERPL-MCNC: 71 MG/DL (ref 40–59)
LDLC SERPL CALC-MCNC: 50 MG/DL (ref ?–100)
MICROALBUMIN UR-MCNC: 0.84 MG/DL
MICROALBUMIN/CREAT 24H UR-RTO: 7.6 UG/MG (ref ?–30)
NONHDLC SERPL-MCNC: 92 MG/DL (ref ?–130)
OSMOLALITY SERPL CALC.SUM OF ELEC: 290 MOSM/KG (ref 275–295)
POTASSIUM SERPL-SCNC: 3.8 MMOL/L (ref 3.5–5.1)
PROT SERPL-MCNC: 7.1 G/DL (ref 6.4–8.2)
SODIUM SERPL-SCNC: 137 MMOL/L (ref 136–145)
TRIGL SERPL-MCNC: 274 MG/DL (ref 30–149)
VLDLC SERPL CALC-MCNC: 39 MG/DL (ref 0–30)

## 2024-06-17 PROCEDURE — 3078F DIAST BP <80 MM HG: CPT | Performed by: FAMILY MEDICINE

## 2024-06-17 PROCEDURE — 82043 UR ALBUMIN QUANTITATIVE: CPT | Performed by: FAMILY MEDICINE

## 2024-06-17 PROCEDURE — 3051F HG A1C>EQUAL 7.0%<8.0%: CPT | Performed by: FAMILY MEDICINE

## 2024-06-17 PROCEDURE — 3072F LOW RISK FOR RETINOPATHY: CPT | Performed by: FAMILY MEDICINE

## 2024-06-17 PROCEDURE — 99396 PREV VISIT EST AGE 40-64: CPT | Performed by: FAMILY MEDICINE

## 2024-06-17 PROCEDURE — 99214 OFFICE O/P EST MOD 30 MIN: CPT | Performed by: FAMILY MEDICINE

## 2024-06-17 PROCEDURE — 80061 LIPID PANEL: CPT | Performed by: FAMILY MEDICINE

## 2024-06-17 PROCEDURE — 83036 HEMOGLOBIN GLYCOSYLATED A1C: CPT | Performed by: FAMILY MEDICINE

## 2024-06-17 PROCEDURE — 3008F BODY MASS INDEX DOCD: CPT | Performed by: FAMILY MEDICINE

## 2024-06-17 PROCEDURE — 82570 ASSAY OF URINE CREATININE: CPT | Performed by: FAMILY MEDICINE

## 2024-06-17 PROCEDURE — 80053 COMPREHEN METABOLIC PANEL: CPT | Performed by: FAMILY MEDICINE

## 2024-06-17 PROCEDURE — 3075F SYST BP GE 130 - 139MM HG: CPT | Performed by: FAMILY MEDICINE

## 2024-06-17 PROCEDURE — 3061F NEG MICROALBUMINURIA REV: CPT | Performed by: FAMILY MEDICINE

## 2024-06-17 RX ORDER — TAMSULOSIN HYDROCHLORIDE 0.4 MG/1
0.4 CAPSULE ORAL DAILY
Qty: 90 CAPSULE | Refills: 1 | Status: SHIPPED | OUTPATIENT
Start: 2024-06-17

## 2024-06-17 NOTE — PROGRESS NOTES
Chief Complaint   Patient presents with    Physical     No concerns       Jose Hall is a 58 year old year old who presents for recheck of diabetes.   Patient's blood pressure is under control.   Patient's eye exam: UTD  Last A1C was not at goal.   Compliance to diet: improved  Compliance to exercise: improved  Compliance to taking all medication: no  Patient has no history of diabetic foot ulcer.     Pt complains of nothing new, due for wellness visit.  Patient has history of HLD and taking crestor at 20 mg q daily.     Last A1c value was 8.2% done 11/16/2023.  History of BPH-needs refill on flomax.      Patient denies shortness of breath, denies chest pain and denies any recent fevers or chills.    Patient reports no urinary complaints and denies headaches or visual disturbances.   Patient denies any abdominal pain at this time. Patient has no new skin lesions.  Patient reports no acute back pain and reports no dizziness or headaches.   Patient reports no visual disturbances and reports hearing has been about the same.   Patient reports no recent injury or trauma.          Latest Ref Rng & Units 6/17/2024     2:10 PM 6/17/2024     1:59 PM 5/2/2024    12:01 PM 11/16/2023     8:16 AM 11/9/2023     4:21 PM 11/9/2023    11:43 AM 7/12/2023     7:22 AM   DIABETIC FLOWSHEET (EEH)   BMI   25.69 kg/m2 26 kg/m2   27.41 kg/m2    Hgb A1c <5.7 %    8.2    7.8    Cholesterol Total <200 mg/dL    143    173    Triglycerides 30 - 149 mg/dL    60    50    HDL 40 - 59 mg/dL    66    74    LDL <100 mg/dL    64    89    HEMOGLOBIN A1c <5.7 %    8.2    7.8    Weight (enc vitals)   164 lb 166 lb   175 lb    BP (enc vitals)   136/70 138/80   136/80    Last Eye Exam  4/11/2024 6/8/2023     [facility]     Eye Exam Location  [place], [place]     Eye Exam Retinopathy  No    No     PHQ2 Score    0           Current medications:   Current Outpatient Medications:     tamsulosin 0.4 MG Oral  Cap, Take 1 capsule (0.4 mg total) by mouth daily., Disp: 90 capsule, Rfl: 1    Doxycycline Hyclate 20 MG Oral Tab, Take 1 tablet (20 mg total) by mouth daily., Disp: , Rfl:     rosuvastatin 20 MG Oral Tab, Take 1 tablet (20 mg total) by mouth nightly., Disp: 90 tablet, Rfl: 3     Last documented BP: 136/70    HgbA1C (%)   Date Value   11/16/2023 8.2 (H)   07/12/2023 7.8 (H)   11/10/2022 7.2 (H)   04/28/2022 9.8     LDL Cholesterol Calc (mg/dL)   Date Value   08/01/2013 158     LDL Cholesterol (mg/dL)   Date Value   11/16/2023 64   07/12/2023 89   11/10/2022 88     Calculated LDL (mg/dL)   Date Value   03/30/2022 148 (H)     AST (SGOT)   Date Value   06/09/2015 21 IU/L   08/01/2013 20 U/L     AST (U/L)   Date Value   11/16/2023 20   07/12/2023 24   11/10/2022 18     ALT (SGPT)   Date Value   06/09/2015 36 IU/L   08/01/2013 32 U/L     ALT (U/L)   Date Value   11/16/2023 31   07/12/2023 37   11/10/2022 33        reports that he quit smoking about 31 years ago. His smoking use included cigarettes. He has never used smokeless tobacco.    Counseling given: Not Answered      Immunization History   Administered Date(s) Administered    Influenza 12/07/2006    Kenalog Per 10mg Inj 03/13/2020, 01/12/2021, 05/05/2021       Review of Systems   Constitutional: Negative for fever, chills and fatigue. No distress.  HENT: Negative for hearing loss, congestion, sore throat, neck pain and dental problem.    Eyes: Negative for pain and visual disturbance.   Respiratory: Negative for cough, chest tightness, shortness of breath and wheezing.    Cardiovascular: Negative for chest pain, palpitations and leg swelling.   Gastrointestinal: Negative for nausea, vomiting, abdominal pain, diarrhea, blood in stool and abdominal distention.   Genitourinary: Negative for dysuria, hematuria and difficulty urinating.   Musculoskeletal: Negative for myalgias, back pain, joint swelling, arthralgias and gait problem.   Skin: Negative for color change  and rash.   Neurological: Negative for dizziness, syncope, weakness, numbness, tingling and headaches.   Hematological: Negative for adenopathy. Does not bruise/bleed easily.   Psychiatric/Behavioral: The patient is not nervous/anxious. No depression.    Patient Active Problem List   Diagnosis    Internal derangement of left knee    Complex tear of medial meniscus of right knee as current injury, subsequent encounter    S/P arthroscopic surgery of left knee    Chondromalacia of right knee    Mixed hyperlipidemia    Essential hypertension    Non morbid obesity due to excess calories    Murmur    Atrial septal aneurysm    Internal derangement of knee, right    S/P arthroscopic surgery of right knee    Pre-diabetes    Pure hypercholesterolemia    Strain of neck muscle    Thoracic back sprain    Primary osteoarthritis of right knee       Current Outpatient Medications   Medication Sig Dispense Refill    tamsulosin 0.4 MG Oral Cap Take 1 capsule (0.4 mg total) by mouth daily. 90 capsule 1    Doxycycline Hyclate 20 MG Oral Tab Take 1 tablet (20 mg total) by mouth daily.      rosuvastatin 20 MG Oral Tab Take 1 tablet (20 mg total) by mouth nightly. 90 tablet 3       Past Medical History:    Allergic rhinitis    Arthritis    Other and unspecified hyperlipidemia     Past Surgical History:   Procedure Laterality Date    Colonoscopy  04/2016    tics; repeat 10 yrs    Colonoscopy  2015    Colonoscopy,diagnostic N/A 04/08/2016    Procedure: COLONOSCOPY, POSSIBLE BIOPSY, POSSIBLE POLYPECTOMY 00538;  Surgeon: Rolando Rivera MD;  Location: [facility]    Knee scope,med/lat menisectomy Left 08/10/2015    Procedure: ARTHROSCOPY LEFT KNEE W/ MEDIAL MENISCECTOMY;  Surgeon: Lombardi, John A, MD;  [place] [facility] [place] [facility]    Other surgical history  2016,2021    Knee    Tonsillectomy  Not sure     Family History   Problem Relation Age of Onset    Cancer Mother     Cancer Maternal Grandmother     Heart Disease Neg      Stroke Neg      Social History     Socioeconomic History    Marital status:    Tobacco Use    Smoking status: Former     Current packs/day: 0.00     Types: Cigarettes     Quit date: 5/15/1993     Years since quittin.1    Smokeless tobacco: Never   Vaping Use    Vaping status: Never Used   Substance and Sexual Activity    Alcohol use: Yes     Alcohol/week: 6.0 standard drinks of alcohol     Types: 6 Cans of beer per week     Comment: occasional    Drug use: No   Other Topics Concern    Caffeine Concern No    Exercise Yes    Seat Belt No    Special Diet No    Stress Concern No    Weight Concern Yes       Physical Exam  /70   Pulse 79   Temp 98.3 °F (36.8 °C)   Resp 14   Ht 5' 7\" (1.702 m)   Wt 164 lb (74.4 kg)   SpO2 98%   BMI 25.69 kg/m²   Constitutional: Alert and Oriented x 3.   HEENT:  Normocephalic and atraumatic. Hearing is normal. Nose normal. Oropharynx is clear and moist.   Eyes: Conjunctivae is normal.   Neck: Normal range of motion. Neck supple. Normal carotid pulses and no JVD present. No edema present. No mass and no thyromegaly present.   Cardiovascular: Normal rate, regular rhythm and intact distal pulses.  No murmur, rubs or gallops.   Pulmonary/Chest: no respiratory distress. Lungs are clear.   Abdominal: Soft. Bowel sounds are normal. Non tender  Musculoskeletal: Normal range of motion.   Neurological: Normal reflexes. No cranial nerve deficit or sensory deficit. normal muscle tone. Coordination normal.   Ext: peripheral pulses normal, no pedal edema, no clubbing or cyanosis, feet normal, good pulses, normal color, temperature and sensation, monofilament sensory exam is normal in both feet    Skin: Skin is warm and dry  Psychiatric: Normal mood and affect.       A/P:    1. Annual physical exam  -wellness visit was done    2. Type 2 diabetes mellitus without complication, without long-term current use of insulin (HCC)  -will check labs  - Microalb/Creat Ratio, Random Urine;  Future    3. Benign prostatic hyperplasia without lower urinary tract symptoms  -refill on rx.   - tamsulosin 0.4 MG Oral Cap; Take 1 capsule (0.4 mg total) by mouth daily.  Dispense: 90 capsule; Refill: 1    4. Mixed hyperlipidemia  -stable, CPM.        Diabetes Education:  Diabetes disease process, Nutritional Management, Physical Activity, Using Medications, Monitoring, Preventing Acute Complications, Preventing Chronic Complications, Behavior Change Strategies, Risk Reduction Strategies   Reducing Risk: Daily foot checks, BP Control, Lipid Control, Dilated eye exam, Skin/dental care, Urine for microalbumin, Weight Control  Medication: Take at appropriate times, Take prescribed dose  Healthy Eating: Evenly spaced carb balanced meals, No skipped meals, Increase fiber, fruits and veggies     Pt verbalized understanding and has no further questions at this time.  Over 30 minutes was spent with patient reviewing medication, reviewing labs, and medical plan.    Greater than 50% of visit spent on education and counseling.  Office Follow up visit:  3-6 months.

## 2024-06-18 DIAGNOSIS — E11.9 TYPE 2 DIABETES MELLITUS WITHOUT COMPLICATION, WITHOUT LONG-TERM CURRENT USE OF INSULIN (HCC): Primary | ICD-10-CM

## 2024-09-20 DIAGNOSIS — N40.0 BENIGN PROSTATIC HYPERPLASIA WITHOUT LOWER URINARY TRACT SYMPTOMS: ICD-10-CM

## 2024-09-23 RX ORDER — TAMSULOSIN HYDROCHLORIDE 0.4 MG/1
0.4 CAPSULE ORAL DAILY
Qty: 90 CAPSULE | Refills: 1 | Status: SHIPPED | OUTPATIENT
Start: 2024-09-23

## 2025-02-06 ENCOUNTER — PATIENT MESSAGE (OUTPATIENT)
Dept: FAMILY MEDICINE CLINIC | Facility: CLINIC | Age: 60
End: 2025-02-06

## 2025-02-11 ENCOUNTER — OFFICE VISIT (OUTPATIENT)
Dept: FAMILY MEDICINE CLINIC | Facility: CLINIC | Age: 60
End: 2025-02-11
Payer: COMMERCIAL

## 2025-02-11 VITALS
RESPIRATION RATE: 21 BRPM | SYSTOLIC BLOOD PRESSURE: 148 MMHG | OXYGEN SATURATION: 98 % | BODY MASS INDEX: 28.72 KG/M2 | DIASTOLIC BLOOD PRESSURE: 80 MMHG | WEIGHT: 183 LBS | HEART RATE: 98 BPM | HEIGHT: 67 IN

## 2025-02-11 DIAGNOSIS — R03.0 ELEVATED BLOOD PRESSURE READING: ICD-10-CM

## 2025-02-11 DIAGNOSIS — H61.21 IMPACTED CERUMEN OF RIGHT EAR: ICD-10-CM

## 2025-02-11 DIAGNOSIS — H81.11 BENIGN PAROXYSMAL POSITIONAL VERTIGO OF RIGHT EAR: Primary | ICD-10-CM

## 2025-02-11 PROCEDURE — 99214 OFFICE O/P EST MOD 30 MIN: CPT

## 2025-02-11 PROCEDURE — 3079F DIAST BP 80-89 MM HG: CPT

## 2025-02-11 PROCEDURE — 69209 REMOVE IMPACTED EAR WAX UNI: CPT

## 2025-02-11 PROCEDURE — 3077F SYST BP >= 140 MM HG: CPT

## 2025-02-11 PROCEDURE — 3008F BODY MASS INDEX DOCD: CPT

## 2025-02-11 RX ORDER — MECLIZINE HCL 12.5 MG 12.5 MG/1
12.5 TABLET ORAL 3 TIMES DAILY PRN
Qty: 30 TABLET | Refills: 0 | Status: SHIPPED | OUTPATIENT
Start: 2025-02-11

## 2025-02-11 NOTE — PROGRESS NOTES
[facility] Office Note  Chief Complaint:   Chief Complaint   Patient presents with    Ear Pain     Right side    Headache     X2 weeks, dull headache    Neck Pain     And shoulder pain       HPI:   Jose Hall is a 59 year old male coming in for right sided ear pain, headache for 2 weeks and neck/shoulder pain. Right ear bothers him more.    Feels off and headache for the last couple weeks. Also some tightness in back of the neck.    Denies sinus pressure and congestion.    When he moves certain ways, he feels cloudy. Getting up fast from the floor and getting up when when he's laying down makes dizziness worse. Denies vision changes  Just has tried Advil for relief. Reports he sees chiropractor about two times a month and is considering seeing chiropractor again to see if it will help relieve his dizziness.    Denies nausea or vomiting.    Past Medical History:    Allergic rhinitis    Arthritis    Other and unspecified hyperlipidemia     Past Surgical History:   Procedure Laterality Date    Colonoscopy  2016    tics; repeat 10 yrs    Colonoscopy      Colonoscopy,diagnostic N/A 2016    Procedure: COLONOSCOPY, POSSIBLE BIOPSY, POSSIBLE POLYPECTOMY 01347;  Surgeon: Rolando Rivera MD;  Location: [facility]    Knee scope,med/lat menisectomy Left 08/10/2015    Procedure: ARTHROSCOPY LEFT KNEE W/ MEDIAL MENISCECTOMY;  Surgeon: Lombardi, John A, MD;  [place] [facility] [place] [facility]    Other surgical history      Knee    Tonsillectomy  Not sure     Social History:  Social History     Socioeconomic History    Marital status:    Tobacco Use    Smoking status: Former     Current packs/day: 0.00     Types: Cigarettes     Quit date: 5/15/1993     Years since quittin.7    Smokeless tobacco: Never   Vaping Use    Vaping status: Never Used   Substance and Sexual Activity    Alcohol use: Yes     Alcohol/week: 6.0 standard drinks of alcohol     Types: 6 Cans of  beer per week     Comment: occasional    Drug use: No   Other Topics Concern    Caffeine Concern No    Exercise Yes    Seat Belt No    Special Diet No    Stress Concern No    Weight Concern Yes     Family History:  Family History   Problem Relation Age of Onset    Cancer Mother     Cancer Maternal Grandmother     Heart Disease Neg     Stroke Neg      Allergies:  No Known Allergies  Current Meds:  Current Outpatient Medications   Medication Sig Dispense Refill    meclizine 12.5 MG Oral Tab Take 1 tablet (12.5 mg total) by mouth 3 (three) times daily as needed. 30 tablet 0    tamsulosin 0.4 MG Oral Cap Take 1 capsule (0.4 mg total) by mouth daily. 90 capsule 1    Doxycycline Hyclate 20 MG Oral Tab Take 1 tablet (20 mg total) by mouth daily.      rosuvastatin 20 MG Oral Tab Take 1 tablet (20 mg total) by mouth nightly. 90 tablet 3      Counseling given: Not Answered       REVIEW OF SYSTEMS:   Review of Systems   Constitutional:  Negative for chills, diaphoresis and fever.   HENT:  Negative for congestion, hearing loss and rhinorrhea.    Eyes:  Negative for photophobia, pain, redness and visual disturbance.   Respiratory:  Negative for shortness of breath.    Cardiovascular:  Negative for chest pain.   Neurological:  Positive for dizziness and headaches. Negative for weakness and light-headedness.        EXAM:   /80   Pulse 98   Resp 21   Ht 5' 7\" (1.702 m)   Wt 183 lb (83 kg)   SpO2 98%   BMI 28.66 kg/m²  Estimated body mass index is 28.66 kg/m² as calculated from the following:    Height as of this encounter: 5' 7\" (1.702 m).    Weight as of this encounter: 183 lb (83 kg).   Vital signs reviewed.Appears stated age, well groomed.  Physical Exam  Constitutional:       Appearance: Normal appearance.   HENT:      Head: Normocephalic and atraumatic.      Right Ear: External ear normal. There is impacted cerumen.      Left Ear: Tympanic membrane and external ear normal.      Nose: Nose normal. No congestion or  rhinorrhea.      Mouth/Throat:      Mouth: Mucous membranes are moist.      Pharynx: Oropharynx is clear. No oropharyngeal exudate or posterior oropharyngeal erythema.   Eyes:      Extraocular Movements: Extraocular movements intact.      Conjunctiva/sclera: Conjunctivae normal.   Cardiovascular:      Rate and Rhythm: Regular rhythm.   Pulmonary:      Effort: Pulmonary effort is normal.      Breath sounds: Normal breath sounds.   Musculoskeletal:         General: Normal range of motion.      Cervical back: Normal range of motion.   Skin:     General: Skin is warm and dry.      Capillary Refill: Capillary refill takes less than 2 seconds.   Neurological:      Mental Status: He is alert and oriented to person, place, and time.      Cranial Nerves: No cranial nerve deficit.      Sensory: No sensory deficit.      Coordination: Coordination normal.   Psychiatric:         Mood and Affect: Mood normal.         Behavior: Behavior normal.         Thought Content: Thought content normal.         Judgment: Judgment normal.       Cerumen Removal Procedure  Patient gave verbal consent.  Risks and Benefits of removal were discussed with the patient, who agreed to proceed with procedure.  Right Ear - Cerumen removed with warm water irrigation using a Rhino Ear Washer Removal and small basin.  Tolerated procedure well and had no complaints.  Right ear was reassessed utilizing otoscope.  There is no bulging of TMs, no erythema, no discharge, no bleeding, no exudate,.  No effusion of the TM was noted.  Advised patient to not use Q-tips to clean the ears and instead use a warm washcloth with finger and gently remove the wax on the outer portion of the ear canal at home.      ASSESSMENT AND PLAN:   1. Benign paroxysmal positional vertigo of right ear  Prescribed meclizine to use PRN. Also prescribed vestibular physical therapy to help with BPPV. Advised can go to chiropractor as well to see if a readjustment can help reduce dizziness  as well.  - meclizine 12.5 MG Oral Tab; Take 1 tablet (12.5 mg total) by mouth 3 (three) times daily as needed.  Dispense: 30 tablet; Refill: 0  - OP REFERRAL TO [facility]    2. Impacted cerumen of right ear  Removed via rhino ear washer removal. Tolerated procedure well and no complaints.     3. Elevated blood pressure reading  Elevated at 148/80 today. Advised to monitor blood pressure at home and heck in morning. If greater than 140/90 for 2+ days in a row,  he is to let our office know. Recommended to follow-up in 2 weeks for blood pressure recheck.      Meds, Orders, & Refills for this Visit:  Requested Prescriptions     Signed Prescriptions Disp Refills    meclizine 12.5 MG Oral Tab 30 tablet 0     Sig: Take 1 tablet (12.5 mg total) by mouth 3 (three) times daily as needed.         Imaging & Consults:  OP REFERRAL TO [facility]  Orders Placed This Encounter   Procedures    OP REFERRAL TO [facility]     Referral Priority:   Routine     Referral Type:   Rehab Services     Requested Specialty:   Rehabilitation     Number of Visits Requested:   8         Health Maintenance:  Health Maintenance Due   Topic Date Due    Pneumococcal Vaccine: 50+ Years (1 of 2 - PCV) Never done    DTaP,Tdap,and Td Vaccines (1 - Tdap) Never done    Zoster Vaccines (1 of 2) Never done    COVID-19 Vaccine (1 - 2024-25 season) Never done    Influenza Vaccine (1) 10/01/2024    PSA  11/10/2024    Diabetes Care A1C  12/17/2024    Annual Depression Screening  01/01/2025    Diabetes Care: Foot Exam (Annual)  01/01/2025    Diabetes Care: Microalb/Creat Ratio (Annual)  01/01/2025    HTN: BP Follow-Up  03/11/2025    Diabetes Care Dilated Eye Exam  04/11/2025         Problem List:  Patient Active Problem List   Diagnosis    Internal derangement of left knee    Complex tear of medial meniscus of right knee as current injury, subsequent encounter    S/P arthroscopic surgery of left knee     Chondromalacia of right knee    Mixed hyperlipidemia    Essential hypertension    Non morbid obesity due to excess calories    Murmur    Atrial septal aneurysm    Internal derangement of knee, right    S/P arthroscopic surgery of right knee    Pre-diabetes    Pure hypercholesterolemia    Strain of neck muscle    Thoracic back sprain    Primary osteoarthritis of right knee         Patient/Caregiver Education: Patient/Caregiver Education: There are no barriers to learning. Medical education done.   Outcome: Jose Hall verbalizes understanding, agrees with the plan, and had no other questions at the end of today's visit. Jose Hall is informed to call with any questions, complications, allergies, or worsening or changing symptoms.  Jose Hall is to call with any side effects or complications from the treatments as a result of today.     Follow-up in   Return in about 2 weeks (around 2/25/2025) for blood pressure.    Note to patient: The 21st Century Cures Act makes medical notes like these available to patients in the interest of transparency. However, this is a medical document intended as peer to peer communication. It is written in medical language and may contain abbreviations or verbiage that are unfamiliar. It may appear blunt or direct. Medical documents are intended to carry relevant information, facts as evident, and the clinical opinion of the practitioner.

## 2025-02-11 NOTE — PATIENT INSTRUCTIONS
[place]    Monitor blood pressure at home. Check in morning. If greater than 140/90 for 2+ days in a row, let our office know.

## 2025-02-19 DIAGNOSIS — N40.0 BENIGN PROSTATIC HYPERPLASIA WITHOUT LOWER URINARY TRACT SYMPTOMS: ICD-10-CM

## 2025-02-19 RX ORDER — TAMSULOSIN HYDROCHLORIDE 0.4 MG/1
0.4 CAPSULE ORAL DAILY
Qty: 90 CAPSULE | Refills: 1 | Status: SHIPPED | OUTPATIENT
Start: 2025-02-19

## 2025-03-06 ENCOUNTER — OFFICE VISIT (OUTPATIENT)
Dept: FAMILY MEDICINE CLINIC | Facility: CLINIC | Age: 60
End: 2025-03-06
Payer: COMMERCIAL

## 2025-03-06 VITALS
BODY MASS INDEX: 28.25 KG/M2 | SYSTOLIC BLOOD PRESSURE: 144 MMHG | RESPIRATION RATE: 16 BRPM | WEIGHT: 180 LBS | OXYGEN SATURATION: 95 % | HEART RATE: 125 BPM | HEIGHT: 67 IN | DIASTOLIC BLOOD PRESSURE: 88 MMHG

## 2025-03-06 DIAGNOSIS — E11.9 TYPE 2 DIABETES MELLITUS WITHOUT COMPLICATION, WITHOUT LONG-TERM CURRENT USE OF INSULIN (HCC): ICD-10-CM

## 2025-03-06 DIAGNOSIS — Z12.5 SCREENING FOR PROSTATE CANCER: ICD-10-CM

## 2025-03-06 DIAGNOSIS — I10 ESSENTIAL HYPERTENSION: Primary | ICD-10-CM

## 2025-03-06 PROCEDURE — 3077F SYST BP >= 140 MM HG: CPT

## 2025-03-06 PROCEDURE — 3008F BODY MASS INDEX DOCD: CPT

## 2025-03-06 PROCEDURE — 99213 OFFICE O/P EST LOW 20 MIN: CPT

## 2025-03-06 PROCEDURE — 3079F DIAST BP 80-89 MM HG: CPT

## 2025-03-06 RX ORDER — LISINOPRIL 10 MG/1
10 TABLET ORAL DAILY
Qty: 30 TABLET | Refills: 0 | Status: SHIPPED | OUTPATIENT
Start: 2025-03-06

## 2025-03-06 NOTE — PROGRESS NOTES
[facility] Office Note  Chief Complaint:   Chief Complaint   Patient presents with    Follow - Up     Headaches, no change       HPI:   Jose Hall is a 59 year old male coming in for a headache. Was last seen with our office 2/11/25 for a dull right sided headache. At the time he was diagnosed with BPPV of the right ear and meclizine & physical therapy were prescribed. His blood pressure was also high at 148/80 on 2/11/25 and was advised to contact our office if his blood pressure was >140/90 for 2 days in a rollow at home.    States he still have a constant dull headache that has not changed since his last visit on 2/11/2025.  He did take his blood pressure at home daily and brought a log of it seen below.  Reports he does have a family history of high blood pressure.  Denies any changes in his vision or balance.  States that he tried the meclizine that was prescribed previously and did not really see a difference in the headaches or any episodes of vertigo since then.  States he is a little nervous today because he has a job interview this afternoon.  States he has never been on blood pressure medication before.          BP Readings from Last 3 Encounters:   03/06/25 144/88   02/11/25 148/80   06/17/24 136/70         Past Medical History:    Allergic rhinitis    Arthritis    Other and unspecified hyperlipidemia     Past Surgical History:   Procedure Laterality Date    Colonoscopy  04/2016    tics; repeat 10 yrs    Colonoscopy  2015    Colonoscopy,diagnostic N/A 04/08/2016    Procedure: COLONOSCOPY, POSSIBLE BIOPSY, POSSIBLE POLYPECTOMY 04842;  Surgeon: Rolando Rivera MD;  Location: [facility]    Knee scope,med/lat menisectomy Left 08/10/2015    Procedure: ARTHROSCOPY LEFT KNEE W/ MEDIAL MENISCECTOMY;  Surgeon: Lombardi, John A, MD;  [place] [facility] [place] [facility]    Other surgical history  2016,2021    Knee    Tonsillectomy  Not sure     Social History:  Social History      Socioeconomic History    Marital status:    Tobacco Use    Smoking status: Former     Current packs/day: 0.00     Types: Cigarettes     Quit date: 5/15/1993     Years since quittin.8    Smokeless tobacco: Never   Vaping Use    Vaping status: Never Used   Substance and Sexual Activity    Alcohol use: Yes     Alcohol/week: 6.0 standard drinks of alcohol     Types: 6 Cans of beer per week     Comment: occasional    Drug use: No   Other Topics Concern    Caffeine Concern No    Exercise Yes    Seat Belt No    Special Diet No    Stress Concern No    Weight Concern Yes     Family History:  Family History   Problem Relation Age of Onset    Cancer Mother     Cancer Maternal Grandmother     Heart Disease Neg     Stroke Neg      Allergies:  No Known Allergies  Current Meds:  Current Outpatient Medications   Medication Sig Dispense Refill    lisinopril 10 MG Oral Tab Take 1 tablet (10 mg total) by mouth daily. 30 tablet 0    TAMSULOSIN 0.4 MG Oral Cap TAKE 1 CAPSULE DAILY 90 capsule 1    meclizine 12.5 MG Oral Tab Take 1 tablet (12.5 mg total) by mouth 3 (three) times daily as needed. 30 tablet 0    Doxycycline Hyclate 20 MG Oral Tab Take 1 tablet (20 mg total) by mouth daily.      rosuvastatin 20 MG Oral Tab Take 1 tablet (20 mg total) by mouth nightly. 90 tablet 3      Counseling given: Not Answered       REVIEW OF SYSTEMS:   Review of Systems   Constitutional:  Negative for chills, fatigue and fever.   Eyes:  Negative for photophobia, pain and visual disturbance.   Respiratory:  Negative for chest tightness and shortness of breath.    Cardiovascular:  Negative for chest pain and palpitations.   Musculoskeletal:  Negative for neck pain and neck stiffness.   Neurological:  Positive for headaches. Negative for dizziness, syncope, weakness and light-headedness.        EXAM:   /88   Pulse (!) 125   Resp 16   Ht 5' 7\" (1.702 m)   Wt 180 lb (81.6 kg)   SpO2 95%   BMI 28.19 kg/m²  Estimated body mass  index is 28.19 kg/m² as calculated from the following:    Height as of this encounter: 5' 7\" (1.702 m).    Weight as of this encounter: 180 lb (81.6 kg).   Vital signs reviewed.Appears stated age, well groomed.  Physical Exam  Constitutional:       General: He is not in acute distress.     Appearance: Normal appearance.   HENT:      Head: Normocephalic and atraumatic.   Eyes:      Extraocular Movements: Extraocular movements intact.      Conjunctiva/sclera: Conjunctivae normal.      Pupils: Pupils are equal, round, and reactive to light.   Cardiovascular:      Rate and Rhythm: Regular rhythm. Tachycardia present.   Pulmonary:      Effort: Pulmonary effort is normal. No respiratory distress.      Breath sounds: Normal breath sounds. No stridor. No wheezing, rhonchi or rales.   Chest:      Chest wall: No tenderness.   Skin:     General: Skin is warm and dry.   Neurological:      Mental Status: He is alert and oriented to person, place, and time.      GCS: GCS eye subscore is 4. GCS verbal subscore is 5. GCS motor subscore is 6.      Cranial Nerves: Cranial nerves 2-12 are intact.   Psychiatric:         Mood and Affect: Mood normal.         Behavior: Behavior normal.         Thought Content: Thought content normal.         Judgment: Judgment normal.        ASSESSMENT AND PLAN:   1. Essential hypertension  Suspect ongoing headache most likely due to hypertension since his blood pressure log at home and during today show blood pressures in the systolic range of 140s up to 150s.  Decided to start lisinopril 10 mg to take daily to lower blood pressure and follow-up in 2 weeks.  Discussed the benefits and side effects of lisinopril and the other different types of blood pressure medications including ARB's, calcium channel blockers, and thiazide diuretics.  Jose agreed with the plan to start blood pressure medication.  - lisinopril 10 MG Oral Tab; Take 1 tablet (10 mg total) by mouth daily.  Dispense: 30 tablet; Refill:  0    2. Type 2 diabetes mellitus without complication, without long-term current use of insulin (HCC)  Due for labs, his PCP Dr. Byrd last saw him to address the diabetes on 6/17/2024 when the last diabetes labs were ordered and Dr. Byrd recommended to follow-up in 3 months to recheck labs.  The labs that Dr. Byrd ordered for the 3-month checkup have still not been completed by Jose.  These labs are hemoglobin A1c, CMP and lipid panel As a result I also ordered a microalbumin creatinine ratio for the diabetes to check as well.  Advised Jose to go to the lab at his convenience when fasting to complete these.  - Microalb/Creat Ratio, Random Urine [E]; Future    3. Screening for prostate cancer  Due for PSA screening.  Order placed  - PSA, Total (Screening) [E]; Future        Meds, Orders, & Refills for this Visit:  Requested Prescriptions     Signed Prescriptions Disp Refills    lisinopril 10 MG Oral Tab 30 tablet 0     Sig: Take 1 tablet (10 mg total) by mouth daily.         Imaging & Consults:  None      Health Maintenance:  Health Maintenance Due   Topic Date Due    Pneumococcal Vaccine: 50+ Years (1 of 2 - PCV) Never done    DTaP,Tdap,and Td Vaccines (1 - Tdap) Never done    Zoster Vaccines (1 of 2) Never done    COVID-19 Vaccine (1 - 2024-25 season) Never done    Influenza Vaccine (1) 10/01/2024    PSA  11/10/2024    Diabetes Care A1C  12/17/2024    Annual Depression Screening  01/01/2025    Diabetes Care: Foot Exam (Annual)  01/01/2025    Diabetes Care: Microalb/Creat Ratio (Annual)  01/01/2025    Diabetes Care Dilated Eye Exam  04/11/2025         Problem List:  Patient Active Problem List   Diagnosis    Internal derangement of left knee    Complex tear of medial meniscus of right knee as current injury, subsequent encounter    S/P arthroscopic surgery of left knee    Chondromalacia of right knee    Mixed hyperlipidemia    Essential hypertension    Non morbid obesity due to excess calories    Murmur     Atrial septal aneurysm    Internal derangement of knee, right    S/P arthroscopic surgery of right knee    Pre-diabetes    Pure hypercholesterolemia    Strain of neck muscle    Thoracic back sprain    Primary osteoarthritis of right knee         Patient/Caregiver Education: Patient/Caregiver Education: There are no barriers to learning. Medical education done.   Outcome: Jose Hall verbalizes understanding, agrees with the plan, and had no other questions at the end of today's visit. Jose Hall is informed to call with any questions, complications, allergies, or worsening or changing symptoms.  Jose Hall is to call with any side effects or complications from the treatments as a result of today.     Follow-up in   Return in about 2 weeks (around 3/20/2025) for blood pressure recheck.    Note to patient: The 21st Century Cures Act makes medical notes like these available to patients in the interest of transparency. However, this is a medical document intended as peer to peer communication. It is written in medical language and may contain abbreviations or verbiage that are unfamiliar. It may appear blunt or direct. Medical documents are intended to carry relevant information, facts as evident, and the clinical opinion of the practitioner.

## 2025-03-18 ENCOUNTER — OFFICE VISIT (OUTPATIENT)
Dept: FAMILY MEDICINE CLINIC | Facility: CLINIC | Age: 60
End: 2025-03-18
Payer: COMMERCIAL

## 2025-03-18 DIAGNOSIS — E78.2 MIXED HYPERLIPIDEMIA: ICD-10-CM

## 2025-03-18 DIAGNOSIS — I10 ESSENTIAL HYPERTENSION: ICD-10-CM

## 2025-03-18 DIAGNOSIS — R51.9 CHRONIC NONINTRACTABLE HEADACHE, UNSPECIFIED HEADACHE TYPE: Primary | ICD-10-CM

## 2025-03-18 DIAGNOSIS — R73.03 PRE-DIABETES: ICD-10-CM

## 2025-03-18 DIAGNOSIS — G89.29 CHRONIC NONINTRACTABLE HEADACHE, UNSPECIFIED HEADACHE TYPE: Primary | ICD-10-CM

## 2025-03-18 PROBLEM — E66.09 NON MORBID OBESITY DUE TO EXCESS CALORIES: Status: RESOLVED | Noted: 2017-04-20 | Resolved: 2025-03-18

## 2025-03-18 PROCEDURE — 3075F SYST BP GE 130 - 139MM HG: CPT | Performed by: FAMILY MEDICINE

## 2025-03-18 PROCEDURE — 3008F BODY MASS INDEX DOCD: CPT | Performed by: FAMILY MEDICINE

## 2025-03-18 PROCEDURE — 3079F DIAST BP 80-89 MM HG: CPT | Performed by: FAMILY MEDICINE

## 2025-03-18 PROCEDURE — 99214 OFFICE O/P EST MOD 30 MIN: CPT | Performed by: FAMILY MEDICINE

## 2025-03-18 RX ORDER — LISINOPRIL 20 MG/1
20 TABLET ORAL DAILY
COMMUNITY
Start: 2025-03-18 | End: 2025-03-20

## 2025-03-20 RX ORDER — LISINOPRIL 20 MG/1
20 TABLET ORAL DAILY
Qty: 90 TABLET | Refills: 0 | Status: SHIPPED | OUTPATIENT
Start: 2025-03-20

## 2025-03-21 ENCOUNTER — LAB ENCOUNTER (OUTPATIENT)
Dept: LAB | Age: 60
End: 2025-03-21
Payer: COMMERCIAL

## 2025-03-21 VITALS
HEART RATE: 117 BPM | HEIGHT: 67 IN | WEIGHT: 178 LBS | RESPIRATION RATE: 21 BRPM | SYSTOLIC BLOOD PRESSURE: 138 MMHG | BODY MASS INDEX: 27.94 KG/M2 | DIASTOLIC BLOOD PRESSURE: 89 MMHG | OXYGEN SATURATION: 97 %

## 2025-03-21 DIAGNOSIS — E11.9 TYPE 2 DIABETES MELLITUS WITHOUT COMPLICATION, WITHOUT LONG-TERM CURRENT USE OF INSULIN (HCC): ICD-10-CM

## 2025-03-21 DIAGNOSIS — Z12.5 SCREENING FOR PROSTATE CANCER: ICD-10-CM

## 2025-03-21 LAB
ALBUMIN SERPL-MCNC: 4.8 G/DL (ref 3.2–4.8)
ALBUMIN/GLOB SERPL: 2 {RATIO} (ref 1–2)
ALP LIVER SERPL-CCNC: 58 U/L
ALT SERPL-CCNC: 25 U/L
ANION GAP SERPL CALC-SCNC: 16 MMOL/L (ref 0–18)
AST SERPL-CCNC: 24 U/L (ref ?–34)
BILIRUB SERPL-MCNC: 0.9 MG/DL (ref 0.3–1.2)
BUN BLD-MCNC: 11 MG/DL (ref 9–23)
CALCIUM BLD-MCNC: 10 MG/DL (ref 8.7–10.6)
CHLORIDE SERPL-SCNC: 97 MMOL/L (ref 98–112)
CHOLEST SERPL-MCNC: 232 MG/DL (ref ?–200)
CO2 SERPL-SCNC: 22 MMOL/L (ref 21–32)
COMPLEXED PSA SERPL-MCNC: 0.55 NG/ML (ref ?–4)
CREAT BLD-MCNC: 1.06 MG/DL
CREAT UR-SCNC: 118.7 MG/DL
EGFRCR SERPLBLD CKD-EPI 2021: 81 ML/MIN/1.73M2 (ref 60–?)
EST. AVERAGE GLUCOSE BLD GHB EST-MCNC: 301 MG/DL (ref 68–126)
FASTING PATIENT LIPID ANSWER: YES
FASTING STATUS PATIENT QL REPORTED: YES
GLOBULIN PLAS-MCNC: 2.4 G/DL (ref 2–3.5)
GLUCOSE BLD-MCNC: 330 MG/DL (ref 70–99)
HBA1C MFR BLD: 12.1 % (ref ?–5.7)
HDLC SERPL-MCNC: 63 MG/DL (ref 40–59)
LDLC SERPL CALC-MCNC: 140 MG/DL (ref ?–100)
MICROALBUMIN UR-MCNC: 2.2 MG/DL
MICROALBUMIN/CREAT 24H UR-RTO: 18.5 UG/MG (ref ?–30)
NONHDLC SERPL-MCNC: 169 MG/DL (ref ?–130)
OSMOLALITY SERPL CALC.SUM OF ELEC: 292 MOSM/KG (ref 275–295)
POTASSIUM SERPL-SCNC: 4.4 MMOL/L (ref 3.5–5.1)
PROT SERPL-MCNC: 7.2 G/DL (ref 5.7–8.2)
SODIUM SERPL-SCNC: 135 MMOL/L (ref 136–145)
TRIGL SERPL-MCNC: 166 MG/DL (ref 30–149)
VLDLC SERPL CALC-MCNC: 31 MG/DL (ref 0–30)

## 2025-03-21 PROCEDURE — 84153 ASSAY OF PSA TOTAL: CPT

## 2025-03-21 PROCEDURE — 82043 UR ALBUMIN QUANTITATIVE: CPT

## 2025-03-21 PROCEDURE — 83036 HEMOGLOBIN GLYCOSYLATED A1C: CPT | Performed by: FAMILY MEDICINE

## 2025-03-21 PROCEDURE — 80053 COMPREHEN METABOLIC PANEL: CPT | Performed by: FAMILY MEDICINE

## 2025-03-21 PROCEDURE — 80061 LIPID PANEL: CPT | Performed by: FAMILY MEDICINE

## 2025-03-21 PROCEDURE — 82570 ASSAY OF URINE CREATININE: CPT

## 2025-03-21 NOTE — PROGRESS NOTES
Subjective:   Jose Hall is a 59 year old male who presents for Headache (F/u/\"Dr. Byrd would like to utilize a tool that securely records the visit conversation to help write his/her medical notes so he/she can pay closer attention to you and less time on the computer\".:[id number]} //The following individual(s) verbally consented to be recorded using ambient AI listening technology and understand that they can each withdraw their consent to this listening technology at any point by asking the clinician to turn off or pause the recording://Patient name: Jose Hall)         History/Other:   History of Present Illness  Jose Hall is a 59 year old male who presents with persistent headaches.    He has been experiencing persistent headaches for the past two months. The headaches are described as dull and present throughout the day, sometimes worsening in the morning or at night. He feels 'foggy' and has difficulty focusing, particularly when reading. The headaches are constant and throbbing but not severe.    He has been taking lisinopril for blood pressure management for about a week and a half, with recent readings around 140/90. He also uses meclizine for dizziness, though he is unsure of its effectiveness. He regularly visits a chiropractor, which he feels provides some relief, particularly when his wife massages his head and neck.    He recalls having migraines as a child, which were severe and light-sensitive, but he does not currently experience nausea or light sensitivity with his headaches. He recently got new glasses about two months ago, around the time the headaches began.    He mentions being out of work for eight months due to personal reasons and has recently accepted a new job. He is dealing with stress related to family issues, including his mother's recent widowhood and his son's struggles with depression and anxiety. He expresses concern about his son's lack of motivation and  energy, which he attributes to a form of ADHD.   Chief Complaint Reviewed and Verified  Nursing Notes Reviewed and   Verified  Tobacco Reviewed  Allergies Reviewed  Medications Reviewed    Problem List Reviewed  Medical History Reviewed  Surgical History   Reviewed  Family History Reviewed  Social History Reviewed       Tobacco:  He smoked tobacco in the past but quit greater than 12 months ago.  Social History     Tobacco Use   Smoking Status Former    Current packs/day: 0.00    Types: Cigarettes    Quit date: 5/15/1993    Years since quittin.8   Smokeless Tobacco Never        Current Outpatient Medications   Medication Sig Dispense Refill    TAMSULOSIN 0.4 MG Oral Cap TAKE 1 CAPSULE DAILY 90 capsule 1    meclizine 12.5 MG Oral Tab Take 1 tablet (12.5 mg total) by mouth 3 (three) times daily as needed. 30 tablet 0    Doxycycline Hyclate 20 MG Oral Tab Take 1 tablet (20 mg total) by mouth daily.      rosuvastatin 20 MG Oral Tab Take 1 tablet (20 mg total) by mouth nightly. 90 tablet 3    lisinopril 20 MG Oral Tab Take 1 tablet (20 mg total) by mouth daily. 90 tablet 0    lisinopril 10 MG Oral Tab Take 1 tablet (10 mg total) by mouth daily. (Patient not taking: Reported on 3/20/2025) 30 tablet 0         Review of Systems:  Review of Systems  Patient denies shortness of breath, denies chest pain and denies any recent fevers or chills.    Patient reports no urinary complaints and denies headaches or visual disturbances.   Patient denies any abdominal pain at this time. Patient has no new skin lesions.  Patient reports no acute back pain and reports no dizziness or headaches.   Patient reports no visual disturbances and reports hearing has been about the same.   Patient reports no recent injury or trauma.       Objective:   /89   Pulse 117   Resp 21   Ht 5' 7\" (1.702 m)   Wt 178 lb (80.7 kg)   SpO2 97%   BMI 27.88 kg/m²  Estimated body mass index is 27.88 kg/m² as calculated from the  following:    Height as of this encounter: 5' 7\" (1.702 m).    Weight as of this encounter: 178 lb (80.7 kg).  Physical Exam  Constitutional:       Appearance: He is well-developed.   Cardiovascular:      Rate and Rhythm: Normal rate and regular rhythm.      Heart sounds: Normal heart sounds.   Pulmonary:      Effort: Pulmonary effort is normal.      Breath sounds: Normal breath sounds.   Abdominal:      General: Bowel sounds are normal.      Palpations: Abdomen is soft.   Skin:     General: Skin is warm and dry.   Neurological:      Mental Status: He is alert and oriented to person, place, and time.      Deep Tendon Reflexes: Reflexes are normal and symmetric.     Bilateral barefoot skin diabetic exam is normal, visualized feet and the appearance is normal.  Bilateral monofilament/sensation of both feet is normal.  Pulsation pedal pulse exam of both lower legs/feet is normal as well.     Results          Assessment & Plan:   1. Chronic nonintractable headache, unspecified headache type (Primary)  -     MRI BRAIN (W+WO) (CPT=70553); Future; Expected date: 03/18/2025  2. Essential hypertension  3. Pre-diabetes  4. Mixed hyperlipidemia    Assessment & Plan  Tension Headache  Chronic dull headache for two months, varying in intensity, often worse in the morning or evening, associated with fogginess. Likely related to neck and shoulder tension, possibly exacerbated by stress from personal and family issues. Borderline hypertension may contribute. Relief with chiropractic adjustments and massage suggests a musculoskeletal component. Differential includes tension headache, with less likelihood of sinus issues or migraines. Consider MRI of the brain if no improvement by April 20, 2025.  - Increase lisinopril dosage from 10 mg to 20 mg daily to manage borderline hypertension and potentially reduce headache frequency.  - Order MRI of the brain if headaches persist without improvement by April 20, 2025.  - Continue  chiropractic adjustments to alleviate neck and shoulder tension.  - Use meclizine only if dizziness with vertigo occurs.    Hypertension  Blood pressure readings are borderline, around 140/90 mmHg. Stress and situational factors may contribute. Current management includes lisinopril, which will be increased to better control blood pressure and potentially alleviate headache symptoms. Regular monitoring is advised to assess the effectiveness of the increased dosage.  - Increase lisinopril dosage from 10 mg to 20 mg daily.  - Monitor blood pressure regularly, especially post-medication.    General Health Maintenance  He is aware of the need for regular blood work for ongoing health management.  - Complete pending blood work by the end of the week.      Jermaine Byrd MD, 3/21/2025, 10:23 AM

## 2025-03-25 ENCOUNTER — OFFICE VISIT (OUTPATIENT)
Dept: FAMILY MEDICINE CLINIC | Facility: CLINIC | Age: 60
End: 2025-03-25
Payer: COMMERCIAL

## 2025-03-25 VITALS
DIASTOLIC BLOOD PRESSURE: 88 MMHG | HEIGHT: 67 IN | WEIGHT: 176 LBS | SYSTOLIC BLOOD PRESSURE: 146 MMHG | RESPIRATION RATE: 16 BRPM | OXYGEN SATURATION: 98 % | TEMPERATURE: 98 F | HEART RATE: 89 BPM | BODY MASS INDEX: 27.62 KG/M2

## 2025-03-25 DIAGNOSIS — I10 ESSENTIAL HYPERTENSION: ICD-10-CM

## 2025-03-25 DIAGNOSIS — R51.9 CHRONIC NONINTRACTABLE HEADACHE, UNSPECIFIED HEADACHE TYPE: ICD-10-CM

## 2025-03-25 DIAGNOSIS — E11.65 TYPE 2 DIABETES MELLITUS WITH HYPERGLYCEMIA, WITHOUT LONG-TERM CURRENT USE OF INSULIN (HCC): Primary | ICD-10-CM

## 2025-03-25 DIAGNOSIS — G89.29 CHRONIC NONINTRACTABLE HEADACHE, UNSPECIFIED HEADACHE TYPE: ICD-10-CM

## 2025-03-25 PROBLEM — K76.0 NAFLD (NONALCOHOLIC FATTY LIVER DISEASE): Status: ACTIVE | Noted: 2018-10-31

## 2025-03-25 PROBLEM — E66.9 OBESITY (BMI 35.0-39.9 WITHOUT COMORBIDITY): Status: ACTIVE | Noted: 2019-11-04

## 2025-03-25 PROBLEM — R94.8 ABNORMAL BILIARY HIDA SCAN: Status: ACTIVE | Noted: 2021-06-09

## 2025-03-25 PROBLEM — R74.8 ABNORMAL LIVER ENZYMES: Status: ACTIVE | Noted: 2018-10-31

## 2025-03-25 PROBLEM — K63.5 COLON POLYP: Status: ACTIVE | Noted: 2018-01-29

## 2025-03-25 PROBLEM — K57.90 DIVERTICULOSIS: Status: ACTIVE | Noted: 2018-01-29

## 2025-03-25 PROBLEM — R10.31 RIGHT GROIN PAIN: Status: ACTIVE | Noted: 2021-07-20

## 2025-03-25 PROBLEM — K74.60 CIRRHOSIS OF LIVER (HCC): Status: ACTIVE | Noted: 2021-06-11

## 2025-03-25 PROBLEM — S46.011A TRAUMATIC COMPLETE TEAR OF RIGHT ROTATOR CUFF: Status: ACTIVE | Noted: 2019-11-04

## 2025-03-25 PROBLEM — I86.1 VARICOCELE: Status: ACTIVE | Noted: 2021-06-10

## 2025-03-25 PROCEDURE — 99214 OFFICE O/P EST MOD 30 MIN: CPT

## 2025-03-25 RX ORDER — METFORMIN HYDROCHLORIDE EXTENDED-RELEASE TABLETS 1000 MG/1
1000 TABLET, FILM COATED, EXTENDED RELEASE ORAL
Refills: 0 | Status: CANCELLED
Start: 2025-03-25

## 2025-03-25 RX ORDER — TIRZEPATIDE 2.5 MG/.5ML
0.5 INJECTION, SOLUTION SUBCUTANEOUS WEEKLY
Qty: 2 ML | Refills: 0 | Status: SHIPPED | OUTPATIENT
Start: 2025-03-25 | End: 2025-04-16

## 2025-03-25 RX ORDER — LANCETS
EACH MISCELLANEOUS
Qty: 100 EACH | Refills: 3 | Status: SHIPPED | OUTPATIENT
Start: 2025-03-25

## 2025-03-25 RX ORDER — TIRZEPATIDE 2.5 MG/.5ML
0.5 INJECTION, SOLUTION SUBCUTANEOUS WEEKLY
Qty: 2 ML | Refills: 0 | Status: CANCELLED
Start: 2025-03-25 | End: 2025-04-16

## 2025-03-25 RX ORDER — TIRZEPATIDE 2.5 MG/.5ML
0.5 INJECTION, SOLUTION SUBCUTANEOUS WEEKLY
Qty: 2 ML | Refills: 0 | Status: SHIPPED | OUTPATIENT
Start: 2025-03-25 | End: 2025-03-25 | Stop reason: SDUPTHER

## 2025-03-25 RX ORDER — TIRZEPATIDE 2.5 MG/.5ML
INJECTION, SOLUTION SUBCUTANEOUS
Refills: 0 | Status: CANCELLED | OUTPATIENT
Start: 2025-03-25

## 2025-03-25 RX ORDER — BLOOD-GLUCOSE METER
1 EACH MISCELLANEOUS DAILY
Qty: 1 KIT | Refills: 0 | Status: SHIPPED | OUTPATIENT
Start: 2025-03-25

## 2025-03-25 RX ORDER — BLOOD SUGAR DIAGNOSTIC
STRIP MISCELLANEOUS
Qty: 100 EACH | Refills: 3 | Status: SHIPPED | OUTPATIENT
Start: 2025-03-25

## 2025-03-25 NOTE — PROGRESS NOTES
[facility] Family Medicine Office Note  Chief Complaint:   Chief Complaint   Patient presents with    Follow - Up     F/u to discuss bloodwork results       HPI:   Jose Hall is a 59 year old male with pmhx of essential hypertension, atrial septal aneurysm (aneurysm followed by cardiology), and hypercholesteremia coming in for a diabetes follow-up. He recently had labwork done on 3/21/25 that showed his A1c was 12.1 and CMP showed a fasting blood glucose of 330. As a result, he falls in the diabetes category.    Labwork also showed elevated cholesterol at 232. Currently takes rosuvastatin 20mg daily prescribed by cardiologist.    He recently saw his PCP Dr. Byrd on 3/18/25 for chronic headache. Lisinopril was increased form 10mg to 20mg to manage borderline hypertension and potentially reduce headache frequency. MRI of brain also ordered to be completed if no improvement by April 20, 2025. Also advised chiropractor adjustments for shoulder tension and to use meclizine if dizziness with vertigo occurs.  States today headaches have improved since starting the 20mg lisinopril.    He states he was a little surprised by the follow-up blood work results, but believes that the increased blood sugars is probably due to poor diet, and exercise for the last 8 months in which he has been looking for a new job.    He has not been checking his blood sugars at home  Patient's blood pressure is under control. Lisinopril recently increased form 10mg to 20mg on 3/18/25 by his PCP.  States he check his blood pressure daily and has been running in the upper 130s to low 140s since starting the 20 mg dose  Patient's eye exam: States it has occurred within the last year.  Cannot recall exactly    Compliance to taking all medication: Currently not taking any medications for diabetes.  Patient has no history of diabetic foot ulcer.   Pt has not been checking his feet on a regular basis. Pt denies any tingling or numbness of  the feet.   Pt complains of slight increase in urination and dry mouth..    Wt Readings from Last 6 Encounters:   03/25/25 176 lb (79.8 kg)   03/18/25 178 lb (80.7 kg)   03/06/25 180 lb (81.6 kg)   02/11/25 183 lb (83 kg)   06/17/24 164 lb (74.4 kg)   05/02/24 166 lb (75.3 kg)     Body mass index is 27.57 kg/m².     HgbA1C (%)   Date Value   03/21/2025 12.1 (H)   06/17/2024 7.7 (H)   11/16/2023 8.2 (H)   04/28/2022 9.8     Cholesterol, Total (mg/dL)   Date Value   03/21/2025 232 (H)   06/17/2024 163   11/16/2023 143   04/28/2022 161   08/01/2013 236     Cholesterol (mg/dL)   Date Value   03/30/2022 226.00 (H)     HDL Cholesterol (mg/dL)   Date Value   03/21/2025 63 (H)   06/17/2024 71 (H)   11/16/2023 66 (H)   04/28/2022 57   08/01/2013 59     Direct HDL (mg/dL)   Date Value   03/30/2022 56     LDL Cholesterol Calc (mg/dL)   Date Value   08/01/2013 158     LDL Cholesterol (mg/dL)   Date Value   03/21/2025 140 (H)   06/17/2024 50   11/16/2023 64     Calculated LDL (mg/dL)   Date Value   03/30/2022 148 (H)     Triglycerides (mg/dL)   Date Value   08/01/2013 95     AST (SGOT)   Date Value   06/09/2015 21 IU/L   08/01/2013 20 U/L     AST (U/L)   Date Value   03/21/2025 24   06/17/2024 16   11/16/2023 20     ALT (SGPT)   Date Value   06/09/2015 36 IU/L   08/01/2013 32 U/L     ALT (U/L)   Date Value   03/21/2025 25   06/17/2024 26   11/16/2023 31        Malb/Cre Calc   Date Value Ref Range Status   03/21/2025 18.5 <=30.0 ug/mg Final     Comment:     <30 ug/mg creatinine       Normal     ug/mg creatinine   Microalbuminuria   >300 ug/mg creatinine      Albuminuria       06/17/2024 7.6 <=30.0 ug/mg Final     Comment:     <30 ug/mg creatinine       Normal     ug/mg creatinine   Microalbuminuria   >300 ug/mg creatinine      Albuminuria       06/16/2023   Final     Comment:     Unable to calculate due to Urine Microalbumin <0.5 mg/dL    Unable to calculate due to Urine Creatinine <13.00 mg/dL        reports that he  quit smoking about 31 years ago. His smoking use included cigarettes. He has never used smokeless tobacco.    Counseling given: Not Answered    Past Medical History:    Allergic rhinitis    Arthritis    Other and unspecified hyperlipidemia     Past Surgical History:   Procedure Laterality Date    Colonoscopy  2016    tics; repeat 10 yrs    Colonoscopy      Colonoscopy,diagnostic N/A 2016    Procedure: COLONOSCOPY, POSSIBLE BIOPSY, POSSIBLE POLYPECTOMY 50707;  Surgeon: Rolando Rivera MD;  Location: [facility]    Knee scope,med/lat menisectomy Left 08/10/2015    Procedure: ARTHROSCOPY LEFT KNEE W/ MEDIAL MENISCECTOMY;  Surgeon: Lombardi, John A, MD;  [facility]    Other surgical history      Knee    Tonsillectomy  Not sure     Social History:  Social History     Socioeconomic History    Marital status:    Tobacco Use    Smoking status: Former     Current packs/day: 0.00     Types: Cigarettes     Quit date: 5/15/1993     Years since quittin.8    Smokeless tobacco: Never   Vaping Use    Vaping status: Never Used   Substance and Sexual Activity    Alcohol use: Yes     Alcohol/week: 6.0 standard drinks of alcohol     Types: 6 Cans of beer per week     Comment: occasional    Drug use: No    Sexual activity: Not Currently   Other Topics Concern    Caffeine Concern Yes    Exercise Yes    Seat Belt No    Special Diet No    Stress Concern No    Weight Concern Yes     Service No    Blood Transfusions No    Occupational Exposure No    Hobby Hazards No    Sleep Concern No    Back Care No    Bike Helmet No    Self-Exams No     Social Drivers of Health     Food Insecurity: No Food Insecurity (3/25/2025)    NCSS - Food Insecurity     Worried About Running Out of Food in the Last Year: No     Ran Out of Food in the Last Year: No   Transportation Needs: No Transportation Needs (3/25/2025)    NCSS - Transportation     Lack of Transportation: No   Housing  Stability: Not At Risk (3/25/2025)    NCSS - Housing/Utilities     Has Housing: Yes     Worried About Losing Housing: No     Unable to Get Utilities: No     Family History:  Family History   Problem Relation Age of Onset    Cancer Mother     Cancer Maternal Grandmother     Heart Disease Neg     Stroke Neg      Allergies:  No Active Allergies  Current Meds:  Current Outpatient Medications   Medication Sig Dispense Refill    Glucose Blood (ACCU-CHEK GUIDE TEST) In Vitro Strip Use as directe to measure blood sugar once daily. 100 each 3    Blood Glucose Monitoring Suppl (ACCU-CHEK GUIDE ME) w/Device Does not apply Kit 1 kit daily. Use as directed to measure blood sugar once daily. 1 kit 0    Accu-Chek Softclix Lancets Does not apply Misc Use as directed to measure blood sugar once daily. 100 each 3    Tirzepatide (MOUNJARO) 2.5 MG/0.5ML Subcutaneous Solution Auto-injector Inject 0.5 mL into the skin once a week for 4 doses. 2 mL 0    lisinopril 20 MG Oral Tab Take 1 tablet (20 mg total) by mouth daily. 90 tablet 0    TAMSULOSIN 0.4 MG Oral Cap TAKE 1 CAPSULE DAILY 90 capsule 1    Doxycycline Hyclate 20 MG Oral Tab Take 1 tablet (20 mg total) by mouth daily.      rosuvastatin 20 MG Oral Tab Take 1 tablet (20 mg total) by mouth nightly. 90 tablet 3      Counseling given: Not Answered       REVIEW OF SYSTEMS:   Review of Systems   Constitutional:  Negative for appetite change, fatigue and unexpected weight change.   Eyes:  Negative for photophobia and visual disturbance.   Respiratory:  Negative for cough and shortness of breath.    Cardiovascular:  Negative for chest pain and palpitations.   Gastrointestinal:  Negative for abdominal pain.   Endocrine: Positive for polydipsia and polyuria.   Genitourinary:  Positive for frequency. Negative for difficulty urinating and dysuria.   Skin:  Negative for color change, rash and wound.   Neurological:  Positive for headaches. Negative for dizziness, weakness and light-headedness.       EXAM:   /88 (BP Location: Left arm, Patient Position: Sitting, Cuff Size: adult)   Pulse 89   Temp 97.9 °F (36.6 °C) (Temporal)   Resp 16   Ht 5' 7\" (1.702 m)   Wt 176 lb (79.8 kg)   SpO2 98%   BMI 27.57 kg/m²  Estimated body mass index is 27.57 kg/m² as calculated from the following:    Height as of this encounter: 5' 7\" (1.702 m).    Weight as of this encounter: 176 lb (79.8 kg).   Vital signs reviewed.Appears stated age, well groomed.  Physical Exam  Constitutional:       Appearance: Normal appearance.   HENT:      Head: Normocephalic and atraumatic.      Right Ear: Tympanic membrane and ear canal normal.      Left Ear: Tympanic membrane and ear canal normal.      Nose: Nose normal. No congestion or rhinorrhea.      Mouth/Throat:      Mouth: Mucous membranes are moist.      Pharynx: Oropharynx is clear. No oropharyngeal exudate or posterior oropharyngeal erythema.   Eyes:      Extraocular Movements: Extraocular movements intact.      Conjunctiva/sclera: Conjunctivae normal.      Pupils: Pupils are equal, round, and reactive to light.   Cardiovascular:      Rate and Rhythm: Normal rate and regular rhythm.      Pulses: Normal pulses.      Heart sounds: Normal heart sounds.   Pulmonary:      Effort: No respiratory distress.      Breath sounds: Normal breath sounds. No stridor. No wheezing, rhonchi or rales.   Chest:      Chest wall: No tenderness.   Abdominal:      General: Abdomen is flat. Bowel sounds are normal. There is no distension.      Palpations: Abdomen is soft. There is no mass.      Tenderness: There is no abdominal tenderness. There is no guarding or rebound.   Skin:     General: Skin is warm and dry.   Neurological:      Mental Status: He is alert and oriented to person, place, and time.   Psychiatric:         Mood and Affect: Mood normal.         Behavior: Behavior normal.         Thought Content: Thought content normal.         Judgment: Judgment normal.        ASSESSMENT AND PLAN:    1. Type 2 diabetes mellitus with hyperglycemia, without long-term current use of insulin ([facility])  A1c at 12.1 indicating diabetes mellitus type 2.  Will trial Mounjaro 2.5 mg for 4 weeks - 0.5 mL injection once weekly to help lower A1c.  Advise follow-up in 3 weeks to see how he is doing on the medication and if if he is experiencing any side effects as well perform a point-of-care A1c tat 3-week follow-up..  Also ordered glucose home monitor, glucose strips and lancets for him to check his blood glucose at home.  Advised to track blood glucose at least daily.  Ordered referral to diabetes nutrition management to help discuss smart diet choices regarding diabetes and provide more information on carbohydrates in relationship to diabetes.  Order 3-month diabetes labs CMP, lipid, hemoglobin A1c.  Had long discussion regarding the side effects of diabetes if blood sugar is not controlled.  Advised to check feet daily to look for any signs of diabetic ulcers or new lesions or wounds.  Also advised to do yearly checkups with his eye doctor.  Discussed the following with Jose:  Diabetes Education:  Diabetes disease process, Nutritional Management, Physical Activity, Using Medications, Monitoring, Preventing Acute Complications, Preventing Chronic Complications, Behavior Change Strategies, Risk Reduction Strategies   Reducing Risk: Daily foot checks, BP Control, Lipid Control, Dilated eye exam, Skin/dental care, Urine for microalbumin, Weight Control  Medication: Take at appropriate times, Take prescribed dose  Healthy Eating: Evenly spaced carb balanced meals, No skipped meals, Increase fiber, fruits and veggies   - EDUCATION-OP REFERRAL TO DIAB NUTRITION MANAGEMENT 3 HRS  - CMP in 3 months; Future  - Lipid in 3 months; Future  - Hemoglobin A1C in 3 months; Future  - Glucose Blood (ACCU-CHEK GUIDE TEST) In Vitro Strip; Use as directe to measure blood sugar once daily.  Dispense: 100 each; Refill: 3  - Blood Glucose  Monitoring Suppl (ACCU-CHEK GUIDE ME) w/Device Does not apply Kit; 1 kit daily. Use as directed to measure blood sugar once daily.  Dispense: 1 kit; Refill: 0  - Accu-Chek Softclix Lancets Does not apply Misc; Use as directed to measure blood sugar once daily.  Dispense: 100 each; Refill: 3  - Tirzepatide (MOUNJARO) 2.5 MG/0.5ML Subcutaneous Solution Auto-injector; Inject 0.5 mL into the skin once a week for 4 doses.  Dispense: 2 mL; Refill: 0    2. Essential hypertension  Improving, is 146/88 during visit today on 20 mg lisinopril.  Continue present management advised to continue monitoring blood pressure daily at home    3. Chronic nonintractable headache, unspecified headache type  Stable, not occurring frequently anymore since lisinopril was bumped up to 20 mg.  Advised to monitor symptoms for now.    Return in about 3 weeks (around 4/15/2025) for med recheck and point of care A1c.         Meds, Orders, & Refills for this Visit:  Requested Prescriptions     Signed Prescriptions Disp Refills    Glucose Blood (ACCU-CHEK GUIDE TEST) In Vitro Strip 100 each 3     Sig: Use as directe to measure blood sugar once daily.    Blood Glucose Monitoring Suppl (ACCU-CHEK GUIDE ME) w/Device Does not apply Kit 1 kit 0     Si kit daily. Use as directed to measure blood sugar once daily.    Accu-Chek Softclix Lancets Does not apply Misc 100 each 3     Sig: Use as directed to measure blood sugar once daily.    Tirzepatide (MOUNJARO) 2.5 MG/0.5ML Subcutaneous Solution Auto-injector 2 mL 0     Sig: Inject 0.5 mL into the skin once a week for 4 doses.         Imaging & Consults:  OP REFERRAL TO DIAB NUTRITION MANAGEMENT 3 HRS      Health Maintenance:  Health Maintenance Due   Topic Date Due    Pneumococcal Vaccine: 50+ Years (1 of 2 - PCV) Never done    DTaP,Tdap,and Td Vaccines (1 - Tdap) Never done    Zoster Vaccines (1 of 2) Never done    COVID-19 Vaccine (1 - - season) Never done    Influenza Vaccine (1) 10/01/2024     Diabetes Care Dilated Eye Exam  04/11/2025    Annual Physical  06/17/2025         Problem List:  Patient Active Problem List   Diagnosis    Internal derangement of left knee    Complex tear of medial meniscus of right knee as current injury, subsequent encounter    S/P arthroscopic surgery of left knee    Chondromalacia of right knee    Mixed hyperlipidemia    Essential hypertension    Murmur    Atrial septal aneurysm    Internal derangement of knee, right    S/P arthroscopic surgery of right knee    Pre-diabetes    Pure hypercholesterolemia    Strain of neck muscle    Thoracic back sprain    Primary osteoarthritis of right knee    Abnormal biliary HIDA scan    Abnormal liver enzymes    Anxiety    Cirrhosis of liver (HCC)    Colon polyp    Diverticulosis    ED (erectile dysfunction)    NAFLD (nonalcoholic fatty liver disease)    Obesity (BMI 35.0-39.9 without comorbidity)    Tobacco use disorder    Right groin pain    Traumatic complete tear of right rotator cuff    Varicocele         Patient/Caregiver Education: Patient/Caregiver Education: There are no barriers to learning. Medical education done.   Outcome: Jose Hall verbalizes understanding, agrees with the plan, and had no other questions at the end of today's visit. Jose Hall is informed to call with any questions, complications, allergies, or worsening or changing symptoms.  Jose Hall is to call with any side effects or complications from the treatments as a result of today.     Note to patient: The 21st Century Cures Act makes medical notes like these available to patients in the interest of transparency. However, this is a medical document intended as peer to peer communication. It is written in medical language and may contain abbreviations or verbiage that are unfamiliar. It may appear blunt or direct. Medical documents are intended to carry relevant information, facts as evident, and the clinical opinion of the practitioner.

## 2025-03-28 ENCOUNTER — TELEPHONE (OUTPATIENT)
Dept: FAMILY MEDICINE CLINIC | Facility: CLINIC | Age: 60
End: 2025-03-28

## 2025-03-28 NOTE — TELEPHONE ENCOUNTER
Approved    Prior authorization approved  Payer: [facility] [employer] Case ID: [id number]    [phone]    [phone]  Note from payer: Your PA request has been approved.  Additional information will be provided in the approval communication. (Message 1142)  Approval Details    Authorized from March 28, 2025 to March 27, 2028  Electronic appeal: Not supported  View History

## 2025-03-30 ENCOUNTER — PATIENT MESSAGE (OUTPATIENT)
Dept: FAMILY MEDICINE CLINIC | Facility: CLINIC | Age: 60
End: 2025-03-30

## 2025-03-31 NOTE — TELEPHONE ENCOUNTER
Patient should continue the course for now as it's unpredictable how long he'll have to remain on mounjaro.  We have tried the course without medication and patient's sugars have gotten to uncontrolled levels. Follow up visit in 4-6 weeks to discuss further. He can check his insurance about \"barb\" that may be an option if he prefers not to finger prick.

## 2025-04-02 NOTE — TELEPHONE ENCOUNTER
Pt asking for Rx for Samir to:  [facility]  [address] [place] [address]  Phone: [phone]  Fax: [phone]

## 2025-04-15 ENCOUNTER — TELEPHONE (OUTPATIENT)
Dept: FAMILY MEDICINE CLINIC | Facility: CLINIC | Age: 60
End: 2025-04-15

## 2025-04-15 NOTE — TELEPHONE ENCOUNTER
Patient is requesting message sent to provider for alternative recommendation     Patient called stating he started a new job and his new health insurance will start on 5/8/25  Meanwhile he recently started on mounjaro and his last dose will be on 4/24  He is looking for advise on what he can do to not miss any doses of his mounjaro.   Advised there are no samples the office can provide  Discussed good rx or other cost savings coupons, states with those the out of pocket cost is still too expensive.

## 2025-04-17 NOTE — TELEPHONE ENCOUNTER
Pt following up. States he started a new job and health insurance won't start until 5/8/25. He recently started on mounjaro and on is 3rd dose. His last dose will be on 4/24/25. Pt does not want to pay the $1100 it will cost for Mounjaro. Between now and May 8 when his insurance will kick in, pt asking if there is a cheaper alternative Rx. Please advise.    Future Appointments         Provider Department Appt Notes    In 6 days Aleksandr Rivera PA-C [facility], [address] f/u . $180.70    In 2 weeks Gill Otto, MICHAEL [facility], [facility]

## 2025-04-18 NOTE — TELEPHONE ENCOUNTER
Attempted to call pt's mobile phone. Left VM to call back or view MCM     Future Appointments   Date Time Provider Department Center   4/23/2025  6:30 PM Aleksandr Rivera PA-C [facility] [place]   5/3/2025 11:00 AM Gill Otto RD [facility]

## 2025-04-18 NOTE — TELEPHONE ENCOUNTER
Yes, can discuss cheaper option during upcoming 4/23/25 office visit with me since his last dose of the Mounjaro will be 4/24/25.  Ok to continue Mounjaro he is currently taking until 4/23/25 office visit

## 2025-04-23 ENCOUNTER — OFFICE VISIT (OUTPATIENT)
Dept: FAMILY MEDICINE CLINIC | Facility: CLINIC | Age: 60
End: 2025-04-23
Payer: COMMERCIAL

## 2025-04-23 VITALS
OXYGEN SATURATION: 95 % | WEIGHT: 178 LBS | DIASTOLIC BLOOD PRESSURE: 76 MMHG | SYSTOLIC BLOOD PRESSURE: 128 MMHG | RESPIRATION RATE: 18 BRPM | BODY MASS INDEX: 27.94 KG/M2 | HEART RATE: 71 BPM | HEIGHT: 67 IN

## 2025-04-23 DIAGNOSIS — I10 ESSENTIAL HYPERTENSION: ICD-10-CM

## 2025-04-23 DIAGNOSIS — E11.65 TYPE 2 DIABETES MELLITUS WITH HYPERGLYCEMIA, WITHOUT LONG-TERM CURRENT USE OF INSULIN (HCC): Primary | ICD-10-CM

## 2025-04-23 DIAGNOSIS — E11.65 TYPE 2 DIABETES MELLITUS WITH HYPERGLYCEMIA, WITHOUT LONG-TERM CURRENT USE OF INSULIN (HCC): ICD-10-CM

## 2025-04-23 PROBLEM — E11.9 TYPE 2 DIABETES MELLITUS, WITHOUT LONG-TERM CURRENT USE OF INSULIN (HCC): Status: ACTIVE | Noted: 2025-04-23

## 2025-04-23 LAB — HEMOGLOBIN A1C: 10.6 % (ref 4.3–5.6)

## 2025-04-23 PROCEDURE — 99214 OFFICE O/P EST MOD 30 MIN: CPT

## 2025-04-23 PROCEDURE — 83036 HEMOGLOBIN GLYCOSYLATED A1C: CPT

## 2025-04-23 PROCEDURE — 3046F HEMOGLOBIN A1C LEVEL >9.0%: CPT

## 2025-04-23 PROCEDURE — 3061F NEG MICROALBUMINURIA REV: CPT

## 2025-04-23 PROCEDURE — 3078F DIAST BP <80 MM HG: CPT

## 2025-04-23 PROCEDURE — 3008F BODY MASS INDEX DOCD: CPT

## 2025-04-23 PROCEDURE — 3074F SYST BP LT 130 MM HG: CPT

## 2025-04-23 RX ORDER — HYDROCHLOROTHIAZIDE 12.5 MG/1
1 CAPSULE ORAL
Qty: 2 EACH | Refills: 11 | Status: SHIPPED | OUTPATIENT
Start: 2025-04-23

## 2025-04-23 RX ORDER — HYDROCHLOROTHIAZIDE 12.5 MG/1
1 CAPSULE ORAL
Qty: 2 EACH | Refills: 11 | Status: CANCELLED
Start: 2025-04-23

## 2025-04-23 RX ORDER — TIRZEPATIDE 2.5 MG/.5ML
40 INJECTION, SOLUTION SUBCUTANEOUS WEEKLY
COMMUNITY
Start: 2025-03-28 | End: 2025-04-23

## 2025-04-23 RX ORDER — METFORMIN HYDROCHLORIDE 500 MG/1
500 TABLET, EXTENDED RELEASE ORAL DAILY
Qty: 30 TABLET | Refills: 2 | Status: CANCELLED | OUTPATIENT
Start: 2025-04-23

## 2025-04-23 RX ORDER — TIRZEPATIDE 5 MG/.5ML
0.5 INJECTION, SOLUTION SUBCUTANEOUS WEEKLY
Qty: 2 ML | Refills: 2 | Status: SHIPPED | OUTPATIENT
Start: 2025-04-23

## 2025-04-23 RX ORDER — ORAL SEMAGLUTIDE 3 MG/1
1 TABLET ORAL DAILY
Qty: 30 TABLET | Refills: 2 | Status: CANCELLED | OUTPATIENT
Start: 2025-04-23 | End: 2025-05-23

## 2025-04-23 NOTE — PROGRESS NOTES
Chief Complaint   Patient presents with    Medication Follow-Up      MOUNJARO     HPI:   Jose Hall is a 59 year old year old who presents for recheck of diabetes and Mounjaro follow-up .  Was originally prescribed Mounjaro 2.5 mg on 3/25/2025 to inject once weekly and advised to follow-up in 4 weeks see any side effects from medication.    Pt complains of checking finger blood glucose levels. States he prefers not to prick his finger each day to check his blood sugar and has not been doing so as of lately. Instead, he prefers to utilize a continous glucose monitor and would like a prescription for a Freestyle Samir.  States that he is actually ok with continuing the Mounjaro injections. Reports no side effects so far while on the 2.5mg Mounjaro. His last dose of Mounjaro 2.5mg is tomorrow 4/24/25.      Patient's blood pressure is under control. Has been running in 120s systolic at home.    Patient's eye exam: Occurred within last year, cannot recall date. Has not seen eye doctor since diabetes dx 4 weeks ago but plans to schedule his annual in a few months.    Last A1C was 12.1 on 3/21/25. POC A1c 4/23/25 is 10.6     Compliance to diet: yes, watching carbs closely, has upcoming 5/3/25 appointment with [facility] dietitian    Compliance to exercise: Yes, very active at new job.    Compliance to taking all medication: Yes, has been taking Mounjaro 2.5mg weekly. Last dose is tomorrow 4/24/25.    Pt has been checking feet on a regular basis. Pt denies any tingling of the feet. Pt denies any sx's of depression        Latest Ref Rng & Units 4/23/2025     8:43 PM 4/23/2025     6:13 PM 4/23/2025     5:59 PM 3/25/2025    11:59 PM 3/25/2025     1:30 PM 3/25/2025     1:29 PM 3/21/2025    10:23 AM   DIABETIC FLOWSHEET ([facility])   BMI    27.88 kg/m2  27.57 kg/m2     Hgb A1c 4.3 - 5.6 %  10.6         Lisinopril  20 mg Daily OR 20 mg Daily OR  20 mg Daily OR  10 mg Daily OR-Discontinued (THERAPY COMP)       20 mg Daily OR  10 mg  Daily OR        20 mg Daily OR  10 mg Daily OR        20 mg Daily OR  10 mg Daily OR        20 mg Daily OR    A1C 4.3 - 5.6 %  10.6         Weight (enc vitals)    178 lb  176 lb     BP (enc vitals)    128/76  146/88  138/89   PHQ2 Score       0    PHQ2 Score       0    PHQ2 Score (Depression/Suicide Screening)       0    PHQ4 Score       0    PHQ9 Score       4        Patient not taking       Last documented BP:    BP Readings from Last 3 Encounters:   04/23/25 128/76   03/25/25 146/88   03/21/25 138/89     HEMOGLOBIN A1C (%)   Date Value   04/23/2025 10.6 (A)     HgbA1C (%)   Date Value   03/21/2025 12.1 (H)   06/17/2024 7.7 (H)   11/16/2023 8.2 (H)   04/28/2022 9.8     LDL Cholesterol Calc (mg/dL)   Date Value   08/01/2013 158     LDL Cholesterol (mg/dL)   Date Value   03/21/2025 140 (H)   06/17/2024 50   11/16/2023 64     Calculated LDL (mg/dL)   Date Value   03/30/2022 148 (H)     AST (SGOT)   Date Value   06/09/2015 21 IU/L   08/01/2013 20 U/L     AST (U/L)   Date Value   03/21/2025 24   06/17/2024 16   11/16/2023 20     ALT (SGPT)   Date Value   06/09/2015 36 IU/L   08/01/2013 32 U/L     ALT (U/L)   Date Value   03/21/2025 25   06/17/2024 26   11/16/2023 31       Wt Readings from Last 6 Encounters:   04/23/25 178 lb (80.7 kg)   03/25/25 176 lb (79.8 kg)   03/18/25 178 lb (80.7 kg)   03/06/25 180 lb (81.6 kg)   02/11/25 183 lb (83 kg)   06/17/24 164 lb (74.4 kg)          reports that he quit smoking about 31 years ago. His smoking use included cigarettes. He has a 3.5 pack-year smoking history. He has never used smokeless tobacco.    Counseling given: Not Answered      Immunization History   Administered Date(s) Administered    Influenza 12/07/2006    Kenalog Per 10mg Inj 03/13/2020, 01/12/2021, 05/05/2021       Past Medical History[1]  Past Surgical History[2]  Social History:  Short Social Hx on File[3]  Family History:  Family History[4]  Allergies:  Allergies[5]  Current Meds:  Current Medications[6]    Counseling given: Not Answered         REVIEW OF SYSTEMS:   Review of Systems   Constitutional:  Negative for activity change, appetite change and unexpected weight change.   HENT:  Negative for sore throat, trouble swallowing and voice change.    Eyes:  Negative for photophobia, pain, discharge, redness, itching and visual disturbance.   Respiratory:  Negative for cough and shortness of breath.    Cardiovascular:  Negative for chest pain, palpitations and leg swelling.   Gastrointestinal:  Negative for abdominal distention, abdominal pain, constipation, diarrhea, nausea and vomiting.   Endocrine: Negative for polydipsia, polyphagia and polyuria.   Genitourinary:  Negative for difficulty urinating and dysuria.   Skin:  Negative for rash.   Neurological:  Negative for dizziness, syncope, weakness and light-headedness.        EXAM:   /76   Pulse 71   Resp 18   Ht 5' 7\" (1.702 m)   Wt 178 lb (80.7 kg)   SpO2 95%   BMI 27.88 kg/m²  Estimated body mass index is 27.88 kg/m² as calculated from the following:    Height as of this encounter: 5' 7\" (1.702 m).    Weight as of this encounter: 178 lb (80.7 kg).   Vital signs reviewed.Appears stated age, well groomed.  Physical Exam  Constitutional:       Appearance: Normal appearance.   HENT:      Head: Normocephalic and atraumatic.      Nose: Nose normal.      Mouth/Throat:      Mouth: Mucous membranes are moist.      Pharynx: Oropharynx is clear.   Eyes:      Extraocular Movements: Extraocular movements intact.      Conjunctiva/sclera: Conjunctivae normal.      Pupils: Pupils are equal, round, and reactive to light.   Cardiovascular:      Rate and Rhythm: Normal rate and regular rhythm.      Pulses: Normal pulses.      Heart sounds: Normal heart sounds.   Pulmonary:      Effort: Pulmonary effort is normal. No respiratory distress.      Breath sounds: Normal breath sounds. No stridor. No wheezing, rhonchi or rales.   Chest:      Chest wall: No tenderness.    Abdominal:      General: Abdomen is flat. There is no distension.      Palpations: Abdomen is soft. There is no mass.      Tenderness: There is no abdominal tenderness. There is no guarding or rebound.   Musculoskeletal:      Cervical back: Normal range of motion and neck supple.   Skin:     General: Skin is warm and dry.   Neurological:      Mental Status: He is alert and oriented to person, place, and time.   Psychiatric:         Mood and Affect: Mood normal.         Behavior: Behavior normal.         Thought Content: Thought content normal.         Judgment: Judgment normal.        ASSESSMENT AND PLAN:     1. Type 2 diabetes mellitus with hyperglycemia, without long-term current use of insulin (HCC)  Improving. A1c improved from 12.1 to 10.6 while on Mounjaro 2.5mg. Decided to increase Mounjaro to 5mg weekly injection to promote further decrease in A1c. Advised Jose to finish his last 2.5mg injection tomorrow 4/24/25 as scheduled and begin his 5mg weekly injection dose next Thursday. Sterio.mee sent to [facility] to track blood sugars at home since Jose did not prefer finger blood glucose readings.  Diabetes labs for next diabetes follow-up (June 2025) already ordered. Encouraged patient to complete labs prior to next follow-up. Informed him that he is also due for a physical in June 2025 so he can complete the physical and diabetes follow-up in one visit if he likes. He has upcoming dietitian appointment 5/3/25 to discuss diet choices with diabetes.  Advised to check feet daily to look for any signs of diabetic ulcers or new lesions or wounds. Also advised to do yearly checkups with his eye doctor.   Discussed the following with Jose:  Diabetes Education:  Diabetes disease process, Nutritional Management, Physical Activity, Using Medications, Monitoring, Preventing Acute Complications, Preventing Chronic Complications, Behavior Change Strategies, Risk Reduction Strategies   Reducing Risk: Daily  foot checks, BP Control, Lipid Control, Dilated eye exam, Skin/dental care, Urine for microalbumin, Weight Control  Medication: Take at appropriate times, Take prescribed dose  Healthy Eating: Evenly spaced carb balanced meals, No skipped meals, Increase fiber, fruits and veggies   Return in about 2 months (around 2025) for Diabetes follow-up and Annual Physical (Schedule with Dr. Byrd).  - POC Hemoglobin A1C  - Continuous Glucose Sensor (FREESTYLE ZEN 3 PLUS SENSOR) Does not apply Misc; 1 each every 14 (fourteen) days.  Dispense: 2 each; Refill: 11  - Tirzepatide (MOUNJARO) 5 MG/0.5ML Subcutaneous Solution Auto-injector; Inject 0.5 mL into the skin once a week.  Dispense: 2 mL; Refill: 2    2. Essential hypertension  Stable.  128/76 during visit today.  Continue present management of lisinopril 20 mg 1 tablet daily.  Advised to continue monitoring blood pressure at home.  Blood pressure conservative measures such as diet and exercise discussed as well.    Follow-up in   Return in about 2 months (around 2025) for Diabetes follow-up and Annual Physical (Schedule with Dr. Byrd).      Meds, Orders, & Refills for this Visit:  Requested Prescriptions     Signed Prescriptions Disp Refills    Continuous Glucose Sensor (FREESTYLE ZEN 3 PLUS SENSOR) Does not apply Misc 2 each 11     Si each every 14 (fourteen) days.    Tirzepatide (MOUNJARO) 5 MG/0.5ML Subcutaneous Solution Auto-injector 2 mL 2     Sig: Inject 0.5 mL into the skin once a week.         Imaging & Consults:  None      Health Maintenance:  Health Maintenance Due   Topic Date Due    Pneumococcal Vaccine: 50+ Years (1 of 2 - PCV) Never done    DTaP,Tdap,and Td Vaccines (1 - Tdap) Never done    Zoster Vaccines (1 of 2) Never done    COVID-19 Vaccine (1 - 2024-25 season) Never done    Diabetes Care Dilated Eye Exam  2025    Annual Physical  2025           Problem List:  Problem List[7]      Patient/Caregiver Education:  Patient/Caregiver Education: There are no barriers to learning. Medical education done.   Outcome: Jose Hall verbalizes understanding, agrees with the plan, and had no other questions at the end of today's visit. Jose Hall is informed to call with any questions, complications, allergies, or worsening or changing symptoms.  Jose Hall is to call with any side effects or complications from the treatments as a result of today.       Note to patient: The  Cures Act makes medical notes like these available to patients in the interest of transparency. However, this is a medical document intended as peer to peer communication. It is written in medical language and may contain abbreviations or verbiage that are unfamiliar. It may appear blunt or direct. Medical documents are intended to carry relevant information, facts as evident, and the clinical opinion of the practitioner.              [1]   Past Medical History:   Allergic rhinitis    Arthritis    Other and unspecified hyperlipidemia   [2]   Past Surgical History:  Procedure Laterality Date    Colonoscopy  2016    tics; repeat 10 yrs    Colonoscopy      Colonoscopy,diagnostic N/A 2016    Procedure: COLONOSCOPY, POSSIBLE BIOPSY, POSSIBLE POLYPECTOMY 34766;  Surgeon: Rolando Rivera MD;  Location: [facility]    Knee scope,med/lat menisectomy Left 08/10/2015    Procedure: ARTHROSCOPY LEFT KNEE W/ MEDIAL MENISCECTOMY;  Surgeon: Lombardi, John A, MD;  Location: [facility] [place] [facility]    Other surgical history      Knee    Tonsillectomy  Not sure   [3]   Social History  Socioeconomic History    Marital status:    Tobacco Use    Smoking status: Former     Current packs/day: 0.00     Average packs/day: 0.5 packs/day for 7.0 years (3.5 ttl pk-yrs)     Types: Cigarettes     Quit date: 5/15/1993     Years since quittin.9    Smokeless tobacco: Never   Vaping Use    Vaping status: Never Used   Substance  and Sexual Activity    Alcohol use: Yes     Alcohol/week: 6.0 standard drinks of alcohol     Types: 6 Cans of beer per week     Comment: occasional    Drug use: No    Sexual activity: Not Currently   Other Topics Concern    Caffeine Concern No    Exercise Yes    Seat Belt Yes    Special Diet Yes    Stress Concern No    Weight Concern No     Service No    Blood Transfusions No    Occupational Exposure No    Hobby Hazards No    Sleep Concern No    Back Care No    Bike Helmet No    Self-Exams No     Social Drivers of Health     Food Insecurity: No Food Insecurity (3/25/2025)    NCSS - Food Insecurity     Worried About Running Out of Food in the Last Year: No     Ran Out of Food in the Last Year: No   Transportation Needs: No Transportation Needs (3/25/2025)    NCSS - Transportation     Lack of Transportation: No   Housing Stability: Not At Risk (3/25/2025)    NCSS - Housing/Utilities     Has Housing: Yes     Worried About Losing Housing: No     Unable to Get Utilities: No   [4]   Family History  Problem Relation Age of Onset    Cancer Mother     Cancer Maternal Grandmother     Dementia Father     Heart Disease Neg     Stroke Neg    [5] No Known Allergies  [6]   Current Outpatient Medications   Medication Sig Dispense Refill    Continuous Glucose Sensor (FREESTYLE ZEN 3 PLUS SENSOR) Does not apply Misc 1 each every 14 (fourteen) days. 2 each 11    Tirzepatide (MOUNJARO) 5 MG/0.5ML Subcutaneous Solution Auto-injector Inject 0.5 mL into the skin once a week. 2 mL 2    Glucose Blood (ACCU-CHEK GUIDE TEST) In Vitro Strip Use as directe to measure blood sugar once daily. 100 each 3    Blood Glucose Monitoring Suppl (ACCU-CHEK GUIDE ME) w/Device Does not apply Kit 1 kit daily. Use as directed to measure blood sugar once daily. 1 kit 0    Accu-Chek Softclix Lancets Does not apply Misc Use as directed to measure blood sugar once daily. 100 each 3    lisinopril 20 MG Oral Tab Take 1 tablet (20 mg total) by mouth daily.  90 tablet 0    TAMSULOSIN 0.4 MG Oral Cap TAKE 1 CAPSULE DAILY 90 capsule 1    Doxycycline Hyclate 20 MG Oral Tab Take 1 tablet (20 mg total) by mouth daily.      rosuvastatin 20 MG Oral Tab Take 1 tablet (20 mg total) by mouth nightly. 90 tablet 3   [7]   Patient Active Problem List  Diagnosis    Internal derangement of left knee    Complex tear of medial meniscus of right knee as current injury, subsequent encounter    S/P arthroscopic surgery of left knee    Chondromalacia of right knee    Mixed hyperlipidemia    Essential hypertension    Murmur    Atrial septal aneurysm    Internal derangement of knee, right    S/P arthroscopic surgery of right knee    Pre-diabetes    Pure hypercholesterolemia    Strain of neck muscle    Thoracic back sprain    Primary osteoarthritis of right knee    Abnormal biliary HIDA scan    Abnormal liver enzymes    Anxiety    Cirrhosis of liver (HCC)    Colon polyp    Diverticulosis    ED (erectile dysfunction)    NAFLD (nonalcoholic fatty liver disease)    Obesity (BMI 35.0-39.9 without comorbidity)    Tobacco use disorder    Right groin pain    Traumatic complete tear of right rotator cuff    Varicocele

## 2025-04-28 RX ORDER — TIRZEPATIDE 2.5 MG/.5ML
40 INJECTION, SOLUTION SUBCUTANEOUS WEEKLY
Qty: 4 ML | Refills: 0 | OUTPATIENT
Start: 2025-04-28

## 2025-05-04 DIAGNOSIS — E11.65 TYPE 2 DIABETES MELLITUS WITH HYPERGLYCEMIA, WITHOUT LONG-TERM CURRENT USE OF INSULIN (HCC): ICD-10-CM

## 2025-05-04 DIAGNOSIS — N40.0 BENIGN PROSTATIC HYPERPLASIA WITHOUT LOWER URINARY TRACT SYMPTOMS: ICD-10-CM

## 2025-05-06 RX ORDER — TAMSULOSIN HYDROCHLORIDE 0.4 MG/1
0.4 CAPSULE ORAL DAILY
Qty: 90 CAPSULE | Refills: 3 | Status: SHIPPED | OUTPATIENT
Start: 2025-05-06

## 2025-05-06 RX ORDER — LISINOPRIL 20 MG/1
20 TABLET ORAL DAILY
Qty: 90 TABLET | Refills: 3 | Status: SHIPPED | OUTPATIENT
Start: 2025-05-06

## 2025-05-06 RX ORDER — HYDROCHLOROTHIAZIDE 12.5 MG/1
1 CAPSULE ORAL
Qty: 2 EACH | Refills: 11 | Status: SHIPPED | OUTPATIENT
Start: 2025-05-06

## 2025-05-06 RX ORDER — TIRZEPATIDE 5 MG/.5ML
0.5 INJECTION, SOLUTION SUBCUTANEOUS WEEKLY
Qty: 2 ML | Refills: 2 | Status: SHIPPED | OUTPATIENT
Start: 2025-05-06

## 2025-05-06 NOTE — TELEPHONE ENCOUNTER
Ordered Status Priority Ordering User Department   04/23/25 Sent (none) Aleksandr Rivera PA-C [facility]     Order History  Outpatient  Date/Time Action Taken User Additional Information   04/23/25 1831 Sign Aleksandr Rivera PA-C    05/04/25 0929 Reorder Background, Eric To Order:[id number]     Provider Information    Authorizing Provider Encounter Provider   Aleksandr Rivera PA-C Andeway, Ryan, PA-C     Medication Detail    Medication Quantity Refills Start End   Tirzepatide (MOUNJARO) 5 MG/0.5ML Subcutaneous Solution Auto-injector 2 mL 2 4/23/2025 --   Sig:   Inject 0.5 mL into the skin once a week.     Route:   Subcutaneous     Order #:   [id number]       Additional Order Information    Multidose Package Dispensed: No Cost ID: -- Admin Qty: .5 mL    NDC #: -- NDC Qty: --    Calculation: --     Pharmacy Actions and Admin    [facility] Pharmacy Actions     MAR Comment Waste Waste Unit          Outpatient Medication Detail     Disp Refills Start End    Tirzepatide (MOUNJARO) 5 MG/0.5ML Subcutaneous Solution Auto-injector 2 mL 2 4/23/2025 --    Sig - Route: Inject 0.5 mL into the skin once a week. - Subcutaneous    Sent to pharmacy as: Mounjaro 5 MG/0.5ML Subcutaneous Solution Auto-injector (Tirzepatide)    E-Prescribing Status: Receipt confirmed by pharmacy (4/23/2025  6:31 PM CDT)      Associated Diagnoses    Type 2 diabetes mellitus with hyperglycemia, without long-term current use of insulin ([facility])  - Primary        Pharmacy    [facility] [address] [place] [address] ([address] [phone], [phone]

## 2025-05-06 NOTE — TELEPHONE ENCOUNTER
Authorizing Provider Encounter Provider   Aleksandr Rivera PA-C Andeway, Ryan, PA-C     Medication Detail    Medication Quantity Refills Start End   Continuous Glucose Sensor (FREESTYLE ZEN 3 PLUS SENSOR) Does not apply Misc 2 each 2025 --   Si each every 14 (fourteen) days.     Route:   Does not apply     Order #:   [id number]       Additional Order Information    Multidose Package Dispensed: No Cost ID: -- Admin Qty: 1 each    NDC #: -- NDC Qty: --    Calculation: --     Pharmacy Actions and Admin    [facility] Pharmacy Actions     MAR Comment Waste Waste Unit          Outpatient Medication Detail     Disp Refills Start End    Continuous Glucose Sensor (FREESTYLE ZEN 3 PLUS SENSOR) Does not apply Misc 2 each 2025 --    Sig - Route: 1 each every 14 (fourteen) days. - Does not apply    Sent to pharmacy as: FreeStyle Zen 3 Plus Sensor    E-Prescribing Status: Receipt confirmed by pharmacy (2025  6:29 PM CDT)      Associated Diagnoses    Type 2 diabetes mellitus with hyperglycemia, without long-term current use of insulin ([facility])  - Primary        Pharmacy    [facility] - [address] [phone], [phone]

## 2025-05-07 ENCOUNTER — MED REC SCAN ONLY (OUTPATIENT)
Dept: FAMILY MEDICINE CLINIC | Facility: CLINIC | Age: 60
End: 2025-05-07

## 2025-05-16 DIAGNOSIS — E11.65 TYPE 2 DIABETES MELLITUS WITH HYPERGLYCEMIA, WITHOUT LONG-TERM CURRENT USE OF INSULIN (HCC): ICD-10-CM

## 2025-05-19 RX ORDER — TIRZEPATIDE 5 MG/.5ML
0.5 INJECTION, SOLUTION SUBCUTANEOUS WEEKLY
Qty: 2 ML | Refills: 2 | Status: SHIPPED | OUTPATIENT
Start: 2025-05-19

## 2025-05-19 NOTE — TELEPHONE ENCOUNTER
Refill sent to [facility]    Orders Placed This Encounter    Tirzepatide (MOUNJARO) 5 MG/0.5ML Subcutaneous Solution Auto-injector     Sig: Inject 0.5 mL into the skin once a week.     Dispense:  2 mL     Refill:  2

## 2025-05-19 NOTE — TELEPHONE ENCOUNTER
Please review. Refill failed protocol.     Patient is requesting to have prescription sent to [facility].     Tirzepatide 5MG was sent on 5/6/25 to [facility].

## 2025-06-12 ENCOUNTER — LAB ENCOUNTER (OUTPATIENT)
Dept: LAB | Age: 60
End: 2025-06-12
Payer: COMMERCIAL

## 2025-06-12 DIAGNOSIS — E11.65 TYPE 2 DIABETES MELLITUS WITH HYPERGLYCEMIA, WITHOUT LONG-TERM CURRENT USE OF INSULIN (HCC): ICD-10-CM

## 2025-06-12 LAB
ALBUMIN SERPL-MCNC: 4.8 G/DL (ref 3.2–4.8)
ALBUMIN/GLOB SERPL: 2.2 {RATIO} (ref 1–2)
ALP LIVER SERPL-CCNC: 46 U/L (ref 45–117)
ALT SERPL-CCNC: 23 U/L (ref 10–49)
ANION GAP SERPL CALC-SCNC: 8 MMOL/L (ref 0–18)
AST SERPL-CCNC: 23 U/L (ref ?–34)
BILIRUB SERPL-MCNC: 0.7 MG/DL (ref 0.3–1.2)
BUN BLD-MCNC: 11 MG/DL (ref 9–23)
CALCIUM BLD-MCNC: 9.7 MG/DL (ref 8.7–10.6)
CHLORIDE SERPL-SCNC: 103 MMOL/L (ref 98–112)
CHOLEST SERPL-MCNC: 139 MG/DL (ref ?–200)
CO2 SERPL-SCNC: 28 MMOL/L (ref 21–32)
CREAT BLD-MCNC: 0.98 MG/DL (ref 0.7–1.3)
EGFRCR SERPLBLD CKD-EPI 2021: 89 ML/MIN/1.73M2 (ref 60–?)
EST. AVERAGE GLUCOSE BLD GHB EST-MCNC: 163 MG/DL (ref 68–126)
FASTING PATIENT LIPID ANSWER: YES
FASTING STATUS PATIENT QL REPORTED: YES
GLOBULIN PLAS-MCNC: 2.2 G/DL (ref 2–3.5)
GLUCOSE BLD-MCNC: 169 MG/DL (ref 70–99)
HBA1C MFR BLD: 7.3 % (ref ?–5.7)
HDLC SERPL-MCNC: 57 MG/DL (ref 40–59)
LDLC SERPL CALC-MCNC: 70 MG/DL (ref ?–100)
NONHDLC SERPL-MCNC: 82 MG/DL (ref ?–130)
OSMOLALITY SERPL CALC.SUM OF ELEC: 291 MOSM/KG (ref 275–295)
POTASSIUM SERPL-SCNC: 4.3 MMOL/L (ref 3.5–5.1)
PROT SERPL-MCNC: 7 G/DL (ref 5.7–8.2)
SODIUM SERPL-SCNC: 139 MMOL/L (ref 136–145)
TRIGL SERPL-MCNC: 57 MG/DL (ref 30–149)
VLDLC SERPL CALC-MCNC: 9 MG/DL (ref 0–30)

## 2025-06-12 PROCEDURE — 80053 COMPREHEN METABOLIC PANEL: CPT

## 2025-06-12 PROCEDURE — 36415 COLL VENOUS BLD VENIPUNCTURE: CPT

## 2025-06-12 PROCEDURE — 83036 HEMOGLOBIN GLYCOSYLATED A1C: CPT

## 2025-06-12 PROCEDURE — 80061 LIPID PANEL: CPT

## 2025-06-14 ENCOUNTER — OFFICE VISIT (OUTPATIENT)
Dept: FAMILY MEDICINE CLINIC | Facility: CLINIC | Age: 60
End: 2025-06-14
Payer: COMMERCIAL

## 2025-06-14 VITALS
WEIGHT: 169 LBS | OXYGEN SATURATION: 98 % | HEIGHT: 67 IN | HEART RATE: 80 BPM | DIASTOLIC BLOOD PRESSURE: 80 MMHG | SYSTOLIC BLOOD PRESSURE: 120 MMHG | RESPIRATION RATE: 20 BRPM | BODY MASS INDEX: 26.53 KG/M2

## 2025-06-14 DIAGNOSIS — E66.9 OBESITY (BMI 35.0-39.9 WITHOUT COMORBIDITY): ICD-10-CM

## 2025-06-14 DIAGNOSIS — I10 ESSENTIAL HYPERTENSION: ICD-10-CM

## 2025-06-14 DIAGNOSIS — F51.02 ADJUSTMENT INSOMNIA: ICD-10-CM

## 2025-06-14 DIAGNOSIS — E11.65 TYPE 2 DIABETES MELLITUS WITH HYPERGLYCEMIA, WITHOUT LONG-TERM CURRENT USE OF INSULIN (HCC): ICD-10-CM

## 2025-06-14 DIAGNOSIS — Z00.00 ANNUAL PHYSICAL EXAM: Primary | ICD-10-CM

## 2025-06-14 DIAGNOSIS — E78.2 MIXED HYPERLIPIDEMIA: ICD-10-CM

## 2025-06-14 PROBLEM — R73.03 PRE-DIABETES: Status: RESOLVED | Noted: 2022-05-26 | Resolved: 2025-06-14

## 2025-06-14 PROCEDURE — 3079F DIAST BP 80-89 MM HG: CPT | Performed by: FAMILY MEDICINE

## 2025-06-14 PROCEDURE — 3051F HG A1C>EQUAL 7.0%<8.0%: CPT | Performed by: FAMILY MEDICINE

## 2025-06-14 PROCEDURE — 99396 PREV VISIT EST AGE 40-64: CPT | Performed by: FAMILY MEDICINE

## 2025-06-14 PROCEDURE — 3074F SYST BP LT 130 MM HG: CPT | Performed by: FAMILY MEDICINE

## 2025-06-14 PROCEDURE — 3008F BODY MASS INDEX DOCD: CPT | Performed by: FAMILY MEDICINE

## 2025-06-14 PROCEDURE — 99214 OFFICE O/P EST MOD 30 MIN: CPT | Performed by: FAMILY MEDICINE

## 2025-06-14 NOTE — PROGRESS NOTES
The following individual(s) verbally consented to be recorded using ambient AI listening technology and understand that they can each withdraw their consent to this listening technology at any point by asking the clinician to turn off or pause the recording:    Patient name: Joseese Jimenes Isabel

## 2025-06-14 NOTE — PROGRESS NOTES
HPI:    Jose Hall is a 59 year old male who presents for Medication Follow-Up     Lab Results   Component Value Date    A1C 7.3 (H) 06/12/2025    A1C 10.6 (A) 04/23/2025    A1C 12.1 (H) 03/21/2025    A1C 7.7 (H) 06/17/2024    A1C 8.2 (H) 11/16/2023       History of Present Illness  Jose Hall is a 59 year old male with type 2 diabetes who presents for follow-up regarding weight management and sleep issues.    He has been experiencing weight loss, currently weighing 169 pounds, which he attributes to his current medication, Mounjaro, taken at a dose of 5 mg for two to three months. The medication suppresses his appetite, leading to reduced calorie intake. Despite the weight loss, he feels tired and has recent blood sugar readings around 130 to 160 mg/dL.    He started a new job two months ago that involves significant computer use, leading to changes in his sleep pattern. He falls asleep easily but wakes up early around 2:30 to 3:30 AM, despite going to bed at 9:30 PM. He describes his sleep as 'not great' and attributes some issues to blue light exposure from screens. Previously, he stayed up until midnight when not working and did not experience these sleep issues. His last meal is usually around 6:00 to 7:00 PM.    He has noticed less frequent bowel movements since starting Mounjaro and is considering trying a fiber supplement, Napalina, to help with bowel regularity.    He has a lesion on his leg that started as an ingrown hair and now resembles a wart, which he plans to address in a future visit.   Patient presents for recheck of his hypertension. Pt has been taking medications as instructed, no medication side effects, home BP monitoring in the range of 120's systolic and 70's diastolic.     Past History:   He  has a past medical history of Allergic rhinitis (Not sure), Arthritis (2019), and Other and unspecified hyperlipidemia.   He  has a past surgical history that includes knee scope,med/lat  menisectomy (Left, 08/10/2015); colonoscopy (04/2016); colonoscopy,diagnostic (N/A, 04/08/2016); colonoscopy (2015); tonsillectomy (Not sure); and other surgical history (2016,2021).   His family history includes Cancer in his maternal grandmother and mother; Dementia in his father.   He  reports that he quit smoking about 32 years ago. His smoking use included cigarettes. He has a 3.5 pack-year smoking history. He has never used smokeless tobacco. He reports current alcohol use of about 6.0 standard drinks of alcohol per week. He reports that he does not use drugs.     He is not on any long-term medications.   He has no known allergies.   Medications Ordered Prior to this Encounter[1]      REVIEW OF SYSTEMS:   Patient denies shortness of breath, denies chest pain and denies any recent fevers or chills.    Patient reports no urinary complaints and denies headaches or visual disturbances.   Patient denies any abdominal pain at this time. Patient has no new skin lesions.  Patient reports no acute back pain and reports no dizziness or headaches.   Patient reports no visual disturbances and reports hearing has been about the same.   Patient reports no recent injury or trauma.               EXAM:    /80   Pulse 80   Resp 20   Ht 5' 7\" (1.702 m)   Wt 169 lb (76.7 kg)   SpO2 98%   BMI 26.47 kg/m²  Estimated body mass index is 26.47 kg/m² as calculated from the following:    Height as of this encounter: 5' 7\" (1.702 m).    Weight as of this encounter: 169 lb (76.7 kg).    General Appearance:  Alert, cooperative, no distress, appears stated age   Head:  Normocephalic, without obvious abnormality, atraumatic   Eyes:  conjunctiva/cornea is not erythematous.        Nose: No nasal drainage.    Throat: No erythema    Neck: Supple, symmetrical, trachea midline, and normal ROM  thyroid: no obvious nodules   Back:   Symmetric, no curvature, ROM normal, no CVA tenderness   Lungs:   Clear to auscultation bilaterally,  respirations unlabored   Chest Wall:  No tenderness or deformity   Heart:  Regular rate and rhythm, S1, S2 normal, no murmur,   Abdomen:   Soft, non-tender, bowel sounds active. No hernia.    Genitalia:     Rectal:     Extremities: Extremities normal, atraumatic, no cyanosis or edema   Pulses: 2+ and symmetric   Skin: Skin color, texture, turgor normal, no new rashes    Lymph nodes: No obvious cervical adenopathy.    Neurologic and psych: Normal speech, Alert and oriented x 3.   Normal mood, normal insight and judgment.              Assessment & Plan  Sleep Disturbance  Experiencing difficulty with sleep, characterized by early awakening and inability to fall back asleep. Possible causes include circadian rhythm disruption due to blue light exposure from screens and changes in metabolism related to new medication and job. Discussed holistic approaches to improve sleep quality, including regulating circadian rhythm with natural sunlight exposure and adjusting meal timing.  - Recommend using blue light blockers after 7 PM when using screens  - Advise exposure to natural sunlight in the morning to regulate circadian rhythm  - Suggest moving dinner time earlier or having a lighter meal in the evening  - Consider melatonin 5 mg for sleep regulation, not to be used daily  - Discuss potential use of low-dose magnesium for sleep improvement  - Advise reducing bright light exposure in the evening to improve sleep quality    Type 2 Diabetes Mellitus  Blood glucose levels are well-controlled with Mounjaro, with readings around 130-160 mg/dL. Discussed potential to reduce Mounjaro dosage once A1c approaches low 6s. Emphasized importance of monitoring dietary intake to maintain glucose levels. Discussed potential side effect of constipation with Mounjaro and use of natural fiber supplements like Napalina to alleviate this.  - Continue Mounjaro 5 mg  - Plan to reduce Mounjaro dosage to 2.5 mg once A1c is closer to low 6s  -  Schedule follow-up in mid-September to recheck blood glucose levels  - Consider using Napalina for fiber supplementation to address constipation    Wart on Leg  Presence of a wart on the leg, initially thought to be an ingrown hair. Plan to address the wart in the next visit with cryotherapy and excision.  - Schedule procedure to freeze and excise wart during next visit    General Health Maintenance  Discussed upcoming physical examination and colonoscopy schedule. He is up to date with colonoscopy, next due in 2026. Discussed use of tablets as an alternative to traditional colonoscopy preparation method, though it requires taking a large number of tablets.  - Schedule physical examination in July or August  - Plan colonoscopy for 2026         Jermaine Byrd MD, 6/14/2025, 9:11 AM     Note to patient: The 21st Century Cures Act makes medical notes like these available to patients in the interest of transparency. However, this is a medical document intended as peer to peer communication. It is written in medical language and may contain abbreviations or verbiage that are unfamiliar. It may appear blunt or direct. Medical documents are intended to carry relevant information, facts as evident, and the clinical opinion of the practitioner who signs the document.          [1]   Current Outpatient Medications on File Prior to Visit   Medication Sig    Tirzepatide (MOUNJARO) 5 MG/0.5ML Subcutaneous Solution Auto-injector Inject 0.5 mL into the skin once a week.    tamsulosin 0.4 MG Oral Cap Take 1 capsule (0.4 mg total) by mouth daily.    lisinopril 20 MG Oral Tab Take 1 tablet (20 mg total) by mouth daily.    Doxycycline Hyclate 20 MG Oral Tab Take 1 tablet (20 mg total) by mouth daily.    rosuvastatin 20 MG Oral Tab Take 1 tablet (20 mg total) by mouth nightly.    Continuous Glucose Sensor (FREESTYLE ZEN 3 PLUS SENSOR) Does not apply Misc 1 each every 14 (fourteen) days. (Patient not taking: Reported on 6/14/2025)     No  current facility-administered medications on file prior to visit.

## 2025-07-08 DIAGNOSIS — E11.65 TYPE 2 DIABETES MELLITUS WITH HYPERGLYCEMIA, WITHOUT LONG-TERM CURRENT USE OF INSULIN (HCC): ICD-10-CM

## 2025-07-11 NOTE — TELEPHONE ENCOUNTER
For replies, please route to pool: [facility] CENTRAL REFILLS    Please review: medication fails/has no protocol attached.  No future appointments with primary care medicine     Please advise on how many refills for Mounjaro 5 mg --- office visit notes show once closer to an A1C of 6 will reduce dose back to 2.5 mg

## 2025-07-13 RX ORDER — TIRZEPATIDE 5 MG/.5ML
0.5 INJECTION, SOLUTION SUBCUTANEOUS WEEKLY
Qty: 4 ML | Refills: 0 | Status: SHIPPED | OUTPATIENT
Start: 2025-07-13

## 2025-08-07 DIAGNOSIS — E11.65 TYPE 2 DIABETES MELLITUS WITH HYPERGLYCEMIA, WITHOUT LONG-TERM CURRENT USE OF INSULIN (HCC): ICD-10-CM

## 2025-08-08 RX ORDER — TIRZEPATIDE 5 MG/.5ML
0.5 INJECTION, SOLUTION SUBCUTANEOUS WEEKLY
Qty: 4 ML | Refills: 0 | Status: SHIPPED | OUTPATIENT
Start: 2025-08-08

## (undated) DIAGNOSIS — N40.0 BENIGN PROSTATIC HYPERPLASIA WITHOUT LOWER URINARY TRACT SYMPTOMS: ICD-10-CM

## (undated) NOTE — MR AVS SNAPSHOT
[place] [address] [address] [facility] [id number] [phone]               Thank you for choosing us for your health care visit with David Lott MD.  We are glad to serve you and happy to provide you with this summary of The doctor will ask about your symptoms and medical history. He or she will examine your ear, nose, and throat. X-rays are usually not needed.  If your sinusitis recurs, you may have a culture to check for bacteria or imaging tests.     An evaluation will b Commonly known as:  NASONEX           Pravastatin Sodium 80 MG Tabs   Commonly known as:  PRAVACHOL                Where to Get Your Medications      These medications were sent to [facility] [address] [facility], Tips for increasing your physical activity – Adults who are physically active are less likely to develop some chronic diseases than adults who are inactive.      HOW TO GET STARTED: HOW TO STAY MOTIVATED:   Start activities slowly and build up over time Do

## (undated) NOTE — LETTER
03/22/19        [address] [place] [id number]      Dear Rosario Funk records indicate that you have outstanding lab work and or testing that was ordered for you and has not yet been completed:        CBC W Differential W Plat